# Patient Record
Sex: FEMALE | Race: WHITE | NOT HISPANIC OR LATINO | Employment: OTHER | URBAN - METROPOLITAN AREA
[De-identification: names, ages, dates, MRNs, and addresses within clinical notes are randomized per-mention and may not be internally consistent; named-entity substitution may affect disease eponyms.]

---

## 2018-06-02 ENCOUNTER — HOSPITAL ENCOUNTER (EMERGENCY)
Facility: MEDICAL CENTER | Age: 73
End: 2018-06-02
Attending: EMERGENCY MEDICINE
Payer: MEDICARE

## 2018-06-02 VITALS
SYSTOLIC BLOOD PRESSURE: 136 MMHG | DIASTOLIC BLOOD PRESSURE: 76 MMHG | HEIGHT: 66 IN | RESPIRATION RATE: 18 BRPM | WEIGHT: 136.91 LBS | OXYGEN SATURATION: 94 % | HEART RATE: 90 BPM | BODY MASS INDEX: 22 KG/M2 | TEMPERATURE: 98.8 F

## 2018-06-02 DIAGNOSIS — J18.9 ATYPICAL PNEUMONIA: ICD-10-CM

## 2018-06-02 PROCEDURE — 99283 EMERGENCY DEPT VISIT LOW MDM: CPT

## 2018-06-02 RX ORDER — AZITHROMYCIN 250 MG/1
TABLET, FILM COATED ORAL
Qty: 6 TAB | Refills: 0 | Status: SHIPPED | OUTPATIENT
Start: 2018-06-02 | End: 2023-09-06

## 2018-06-02 RX ORDER — ATORVASTATIN CALCIUM 20 MG/1
20 TABLET, FILM COATED ORAL NIGHTLY
COMMUNITY

## 2018-06-02 RX ORDER — LISINOPRIL 20 MG/1
20 TABLET ORAL EVERY EVENING
Status: SHIPPED | COMMUNITY
End: 2023-09-06

## 2018-06-02 RX ORDER — DEXAMETHASONE 4 MG/1
8 TABLET ORAL ONCE
Status: DISCONTINUED | OUTPATIENT
Start: 2018-06-02 | End: 2018-06-02 | Stop reason: HOSPADM

## 2018-06-02 NOTE — DISCHARGE INSTRUCTIONS
Drink lots of fluids  Take Motrin as needed for discomfort  Follow-up with her primary care provider if not better in 5-7 days  Utilize nasal saline spray as discussed

## 2018-06-02 NOTE — ED NOTES
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

## 2018-06-02 NOTE — ED NOTES
"Pt bib . C/o cough that for approximately 10 days. Sates she's coughing up small amount of flam. Denies fever/pain/ SOB.     /76   Pulse 90   Resp 18   Ht 1.676 m (5' 6\")   Wt 62.1 kg (136 lb 14.5 oz)   SpO2 94%   BMI 22.10 kg/m²     "

## 2018-06-02 NOTE — ED PROVIDER NOTES
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       HPI  Yu Kitchen is a 73 y.o. female who presents with a cough. The patient states she's been sick for approximately 10 days. She states initially started with some facial congestion and dry cough. However now she does have a productive component and some slight difficulty breathing. She is not having associated fevers. She has had some decrease in activity. She does not have any chest pain. The patient denies tobacco abuse. The patient is otherwise healthy.    REVIEW OF SYSTEMS  See HPI for further details. All other systems are negative.     PAST MEDICAL HISTORY  No past medical history on file.    SOCIAL HISTORY  Social History     Social History   • Marital status:      Spouse name: N/A   • Number of children: N/A   • Years of education: N/A     Social History Main Topics   • Smoking status: Not on file   • Smokeless tobacco: Not on file   • Alcohol use Not on file   • Drug use: Unknown   • Sexual activity: Not on file     Other Topics Concern   • Not on file     Social History Narrative   • No narrative on file           PHYSICAL EXAM  VITAL SIGNS: /76   Pulse 90   Temp 37.1 °C (98.8 °F)   Resp 18   Ht 1.676 m (5' 6\")   Wt 62.1 kg (136 lb 14.5 oz)   SpO2 94%   BMI 22.10 kg/m²   Constitutional: Well developed, Well nourished, No acute distress, Non-toxic appearance.   HENT: Normocephalic, Atraumatic, tympanic membranes are intact and nonerythematous bilaterally, Oropharynx moist without exudates or erythema, Nose swollen turbinates  Eyes: PERRLA, EOMI, Conjunctiva normal.  Neck: Supple without meningismus  Lymphatic: No lymphadenopathy noted.   Cardiovascular: Normal heart rate, Normal rhythm, No murmurs, No rubs, No gallops.   Thorax & Lungs: Normal breath sounds, No respiratory distress, No wheezing, No chest tenderness. "   Abdomen: Bowel sounds normal, Soft, No tenderness, no rebound, no guarding, no distention, No masses, No pulsatile masses.   Skin: Warm, Dry, No erythema, No rash.   Back: No tenderness, No CVA tenderness.   Extremities: Atraumatic with symmetric distal pulses, No edema, No tenderness, No cyanosis, No clubbing.   Neurologic: Alert & oriented x 3, cranial nerves II through XII are intact, Normal motor function, Normal sensory function, No focal deficits noted.   Psychiatric: Affect normal, Judgment normal, Mood normal.     COURSE & MEDICAL DECISION MAKING  Pertinent Labs & Imaging studies reviewed. (See chart for details)  Is a 73-year-old female who presents with MRSA part with productive cough. I suspect this started as a viral process. Due to the duration will treat her for possible atypical pneumonia as her cough has become more productive. The patient is aware that if she is not better on 5 days of azithromycin she is to follow-up with primary care doctor for imaging. We'll hold off on imaging at this time is a little change care. The patient receive a one-time dose of Decadron for inflammation prior to discharge. She does not appear toxic nor is she hypoxic.    FINAL IMPRESSION  1. Atypical pneumonia versus a viral bronchitis     Disposition  The patient will be discharged in stable condition    Electronically signed by: Pawan Marie, 6/2/2018 10:27 AM

## 2018-06-02 NOTE — ED NOTES
The Medication Reconciliation process has been completed by interviewing the patient    Allergies have been reviewed  Antibiotic use in 30 days - none    Home Pharmacy:  [facility]

## 2021-01-14 DIAGNOSIS — Z23 NEED FOR VACCINATION: ICD-10-CM

## 2022-12-30 PROBLEM — S62.629A: Status: ACTIVE | Noted: 2022-12-30

## 2022-12-30 PROBLEM — S82.001A CLOSED DISPLACED FRACTURE OF RIGHT PATELLA: Status: ACTIVE | Noted: 2022-12-30

## 2023-02-16 ENCOUNTER — APPOINTMENT (RX ONLY)
Dept: URBAN - METROPOLITAN AREA CLINIC 6 | Facility: CLINIC | Age: 78
Setting detail: DERMATOLOGY
End: 2023-02-16

## 2023-02-16 DIAGNOSIS — L82.0 INFLAMED SEBORRHEIC KERATOSIS: ICD-10-CM

## 2023-02-16 DIAGNOSIS — D18.0 HEMANGIOMA: ICD-10-CM

## 2023-02-16 DIAGNOSIS — Z71.89 OTHER SPECIFIED COUNSELING: ICD-10-CM

## 2023-02-16 DIAGNOSIS — D22 MELANOCYTIC NEVI: ICD-10-CM

## 2023-02-16 DIAGNOSIS — L73.8 OTHER SPECIFIED FOLLICULAR DISORDERS: ICD-10-CM

## 2023-02-16 DIAGNOSIS — L82.1 OTHER SEBORRHEIC KERATOSIS: ICD-10-CM

## 2023-02-16 DIAGNOSIS — L81.4 OTHER MELANIN HYPERPIGMENTATION: ICD-10-CM

## 2023-02-16 PROBLEM — D22.62 MELANOCYTIC NEVI OF LEFT UPPER LIMB, INCLUDING SHOULDER: Status: ACTIVE | Noted: 2023-02-16

## 2023-02-16 PROBLEM — D22.61 MELANOCYTIC NEVI OF RIGHT UPPER LIMB, INCLUDING SHOULDER: Status: ACTIVE | Noted: 2023-02-16

## 2023-02-16 PROBLEM — D18.01 HEMANGIOMA OF SKIN AND SUBCUTANEOUS TISSUE: Status: ACTIVE | Noted: 2023-02-16

## 2023-02-16 PROBLEM — D22.71 MELANOCYTIC NEVI OF RIGHT LOWER LIMB, INCLUDING HIP: Status: ACTIVE | Noted: 2023-02-16

## 2023-02-16 PROBLEM — D22.72 MELANOCYTIC NEVI OF LEFT LOWER LIMB, INCLUDING HIP: Status: ACTIVE | Noted: 2023-02-16

## 2023-02-16 PROBLEM — D22.5 MELANOCYTIC NEVI OF TRUNK: Status: ACTIVE | Noted: 2023-02-16

## 2023-02-16 PROCEDURE — ? COUNSELING

## 2023-02-16 PROCEDURE — 17110 DESTRUCTION B9 LES UP TO 14: CPT

## 2023-02-16 PROCEDURE — 99203 OFFICE O/P NEW LOW 30 MIN: CPT | Mod: 25

## 2023-02-16 PROCEDURE — ? LIQUID NITROGEN

## 2023-02-16 ASSESSMENT — LOCATION SIMPLE DESCRIPTION DERM
LOCATION SIMPLE: LEFT ANTERIOR NECK
LOCATION SIMPLE: RIGHT UPPER BACK
LOCATION SIMPLE: RIGHT THIGH
LOCATION SIMPLE: LEFT SHOULDER
LOCATION SIMPLE: GLABELLA
LOCATION SIMPLE: LEFT FOREARM
LOCATION SIMPLE: CHEST
LOCATION SIMPLE: RIGHT UPPER ARM
LOCATION SIMPLE: LEFT THIGH
LOCATION SIMPLE: RIGHT POSTERIOR THIGH
LOCATION SIMPLE: RIGHT LOWER BACK
LOCATION SIMPLE: LEFT UPPER ARM
LOCATION SIMPLE: LEFT CALF
LOCATION SIMPLE: LEFT CHEEK
LOCATION SIMPLE: RIGHT CALF
LOCATION SIMPLE: LEFT UPPER BACK
LOCATION SIMPLE: LEFT POSTERIOR THIGH
LOCATION SIMPLE: RIGHT FOREARM

## 2023-02-16 ASSESSMENT — LOCATION ZONE DERM
LOCATION ZONE: ARM
LOCATION ZONE: TRUNK
LOCATION ZONE: NECK
LOCATION ZONE: LEG
LOCATION ZONE: FACE

## 2023-02-16 ASSESSMENT — LOCATION DETAILED DESCRIPTION DERM
LOCATION DETAILED: LEFT PROXIMAL DORSAL FOREARM
LOCATION DETAILED: LEFT DISTAL POSTERIOR THIGH
LOCATION DETAILED: LEFT ANTERIOR PROXIMAL THIGH
LOCATION DETAILED: RIGHT ANTERIOR PROXIMAL THIGH
LOCATION DETAILED: RIGHT SUPERIOR LATERAL MIDBACK
LOCATION DETAILED: RIGHT PROXIMAL CALF
LOCATION DETAILED: GLABELLA
LOCATION DETAILED: LEFT VENTRAL DISTAL FOREARM
LOCATION DETAILED: LEFT ANTERIOR DISTAL UPPER ARM
LOCATION DETAILED: RIGHT VENTRAL DISTAL FOREARM
LOCATION DETAILED: LEFT INFERIOR UPPER BACK
LOCATION DETAILED: LEFT CLAVICULAR NECK
LOCATION DETAILED: LEFT ANTERIOR SHOULDER
LOCATION DETAILED: LEFT SUPERIOR UPPER BACK
LOCATION DETAILED: RIGHT MEDIAL INFERIOR CHEST
LOCATION DETAILED: LEFT CENTRAL MALAR CHEEK
LOCATION DETAILED: RIGHT MID-UPPER BACK
LOCATION DETAILED: RIGHT ANTERIOR DISTAL UPPER ARM
LOCATION DETAILED: LEFT PROXIMAL CALF
LOCATION DETAILED: RIGHT VENTRAL PROXIMAL FOREARM
LOCATION DETAILED: RIGHT DISTAL POSTERIOR THIGH
LOCATION DETAILED: RIGHT PROXIMAL DORSAL FOREARM

## 2023-02-16 NOTE — PROCEDURE: LIQUID NITROGEN
Include Z78.9 (Other Specified Conditions Influencing Health Status) As An Associated Diagnosis?: No
Post-Care Instructions: I reviewed with the patient in detail post-care instructions. Patient is to wear sunprotection, and avoid picking at any of the treated lesions. Pt may apply Vaseline to crusted or scabbing areas.
Medical Necessity Clause: This procedure was medically necessary because the lesions that were treated were:
Show Spray Paint Technique Variable?: Yes
Consent: The patient's consent was obtained including but not limited to risks of crusting, scabbing, blistering, scarring, darker or lighter pigmentary change, recurrence, incomplete removal and infection.
Detail Level: Detailed
Spray Paint Text: The liquid nitrogen was applied to the skin utilizing a spray paint frosting technique.
Medical Necessity Information: It is in your best interest to select a reason for this procedure from the list below. All of these items fulfill various CMS LCD requirements except the new and changing color options.

## 2023-02-16 NOTE — HPI: FULL BODY SKIN EXAMINATION
How Severe Are Your Spot(S)?: mild
What Type Of Note Output Would You Prefer (Optional)?: Standard Output
What Is The Reason For Today's Visit?: Full Body Skin Examination
What Is The Reason For Today's Visit? (Being Monitored For X): concerning skin lesions on an annual basis
details…

## 2023-09-06 ENCOUNTER — HOSPITAL ENCOUNTER (EMERGENCY)
Facility: MEDICAL CENTER | Age: 78
End: 2023-09-06
Attending: EMERGENCY MEDICINE
Payer: MEDICARE

## 2023-09-06 VITALS
SYSTOLIC BLOOD PRESSURE: 144 MMHG | HEART RATE: 79 BPM | WEIGHT: 137.35 LBS | BODY MASS INDEX: 22.07 KG/M2 | DIASTOLIC BLOOD PRESSURE: 80 MMHG | OXYGEN SATURATION: 96 % | RESPIRATION RATE: 18 BRPM | TEMPERATURE: 97.7 F | HEIGHT: 66 IN

## 2023-09-06 DIAGNOSIS — N39.0 ACUTE UTI: ICD-10-CM

## 2023-09-06 LAB
ALBUMIN SERPL BCP-MCNC: 4.2 G/DL (ref 3.2–4.9)
ALBUMIN/GLOB SERPL: 1.9 G/DL
ALP SERPL-CCNC: 71 U/L (ref 30–99)
ALT SERPL-CCNC: 15 U/L (ref 2–50)
ANION GAP SERPL CALC-SCNC: 12 MMOL/L (ref 7–16)
APPEARANCE UR: ABNORMAL
AST SERPL-CCNC: 19 U/L (ref 12–45)
BACTERIA #/AREA URNS HPF: ABNORMAL /HPF
BASOPHILS # BLD AUTO: 0.7 % (ref 0–1.8)
BASOPHILS # BLD: 0.06 K/UL (ref 0–0.12)
BILIRUB SERPL-MCNC: 0.6 MG/DL (ref 0.1–1.5)
BILIRUB UR QL STRIP.AUTO: NEGATIVE
BUN SERPL-MCNC: 24 MG/DL (ref 8–22)
CALCIUM ALBUM COR SERPL-MCNC: 9 MG/DL (ref 8.5–10.5)
CALCIUM SERPL-MCNC: 9.2 MG/DL (ref 8.4–10.2)
CHLORIDE SERPL-SCNC: 102 MMOL/L (ref 96–112)
CO2 SERPL-SCNC: 25 MMOL/L (ref 20–33)
COLOR UR: YELLOW
CREAT SERPL-MCNC: 0.78 MG/DL (ref 0.5–1.4)
EOSINOPHIL # BLD AUTO: 0.05 K/UL (ref 0–0.51)
EOSINOPHIL NFR BLD: 0.6 % (ref 0–6.9)
EPI CELLS #/AREA URNS HPF: ABNORMAL /HPF
ERYTHROCYTE [DISTWIDTH] IN BLOOD BY AUTOMATED COUNT: 43.8 FL (ref 35.9–50)
GFR SERPLBLD CREATININE-BSD FMLA CKD-EPI: 76 ML/MIN/1.73 M 2
GLOBULIN SER CALC-MCNC: 2.2 G/DL (ref 1.9–3.5)
GLUCOSE SERPL-MCNC: 109 MG/DL (ref 65–99)
GLUCOSE UR STRIP.AUTO-MCNC: NEGATIVE MG/DL
HCT VFR BLD AUTO: 42.3 % (ref 37–47)
HGB BLD-MCNC: 14.4 G/DL (ref 12–16)
IMM GRANULOCYTES # BLD AUTO: 0.02 K/UL (ref 0–0.11)
IMM GRANULOCYTES NFR BLD AUTO: 0.2 % (ref 0–0.9)
KETONES UR STRIP.AUTO-MCNC: NEGATIVE MG/DL
LEUKOCYTE ESTERASE UR QL STRIP.AUTO: ABNORMAL
LYMPHOCYTES # BLD AUTO: 1.48 K/UL (ref 1–4.8)
LYMPHOCYTES NFR BLD: 17.2 % (ref 22–41)
MCH RBC QN AUTO: 30.9 PG (ref 27–33)
MCHC RBC AUTO-ENTMCNC: 34 G/DL (ref 32.2–35.5)
MCV RBC AUTO: 90.8 FL (ref 81.4–97.8)
MICRO URNS: ABNORMAL
MONOCYTES # BLD AUTO: 0.71 K/UL (ref 0–0.85)
MONOCYTES NFR BLD AUTO: 8.2 % (ref 0–13.4)
MUCOUS THREADS #/AREA URNS HPF: ABNORMAL /HPF
NEUTROPHILS # BLD AUTO: 6.29 K/UL (ref 1.82–7.42)
NEUTROPHILS NFR BLD: 73.1 % (ref 44–72)
NITRITE UR QL STRIP.AUTO: NEGATIVE
NRBC # BLD AUTO: 0 K/UL
NRBC BLD-RTO: 0 /100 WBC (ref 0–0.2)
PH UR STRIP.AUTO: 7 [PH] (ref 5–8)
PLATELET # BLD AUTO: 266 K/UL (ref 164–446)
PMV BLD AUTO: 9.1 FL (ref 9–12.9)
POTASSIUM SERPL-SCNC: 3.9 MMOL/L (ref 3.6–5.5)
PROT SERPL-MCNC: 6.4 G/DL (ref 6–8.2)
PROT UR QL STRIP: NEGATIVE MG/DL
RBC # BLD AUTO: 4.66 M/UL (ref 4.2–5.4)
RBC # URNS HPF: ABNORMAL /HPF
RBC UR QL AUTO: NEGATIVE
SODIUM SERPL-SCNC: 139 MMOL/L (ref 135–145)
SP GR UR STRIP.AUTO: 1.01
UNIDENT CRYS URNS QL MICRO: ABNORMAL /HPF
WBC # BLD AUTO: 8.6 K/UL (ref 4.8–10.8)
WBC #/AREA URNS HPF: ABNORMAL /HPF

## 2023-09-06 PROCEDURE — 85025 COMPLETE CBC W/AUTO DIFF WBC: CPT

## 2023-09-06 PROCEDURE — 87077 CULTURE AEROBIC IDENTIFY: CPT

## 2023-09-06 PROCEDURE — 81001 URINALYSIS AUTO W/SCOPE: CPT

## 2023-09-06 PROCEDURE — 80053 COMPREHEN METABOLIC PANEL: CPT

## 2023-09-06 PROCEDURE — 87086 URINE CULTURE/COLONY COUNT: CPT

## 2023-09-06 PROCEDURE — 700102 HCHG RX REV CODE 250 W/ 637 OVERRIDE(OP): Performed by: EMERGENCY MEDICINE

## 2023-09-06 PROCEDURE — A9270 NON-COVERED ITEM OR SERVICE: HCPCS | Performed by: EMERGENCY MEDICINE

## 2023-09-06 PROCEDURE — 36415 COLL VENOUS BLD VENIPUNCTURE: CPT

## 2023-09-06 PROCEDURE — 99284 EMERGENCY DEPT VISIT MOD MDM: CPT

## 2023-09-06 RX ORDER — CEFDINIR 300 MG/1
300 CAPSULE ORAL ONCE
Status: COMPLETED | OUTPATIENT
Start: 2023-09-06 | End: 2023-09-06

## 2023-09-06 RX ORDER — IBUPROFEN 200 MG
200 TABLET ORAL EVERY 6 HOURS PRN
COMMUNITY

## 2023-09-06 RX ORDER — CEFDINIR 300 MG/1
300 CAPSULE ORAL 2 TIMES DAILY
Qty: 14 CAPSULE | Refills: 0 | Status: ACTIVE | OUTPATIENT
Start: 2023-09-06 | End: 2023-09-13

## 2023-09-06 RX ADMIN — CEFDINIR 300 MG: 300 CAPSULE ORAL at 17:23

## 2023-09-06 NOTE — LETTER
9/13/2023               Yu Kitchen  [address] [address]        Dear Yu (MR#[id number])    This letter is sent in regards to your recent visit to the [facility] Emergency Department on 9/6/2023. During the visit, tests were performed to assist the physician in your medical diagnosis. A review of your tests requires that we notify you of the following:    Your urine culture was POSITIVE for a bacteria called Aerococcus urinae. The antibiotic prescribed for you (cefdinir) should be active to treat this bacteria. It is important that you continue taking your antibiotic until it is finished.     Please feel free to contact me at the number below if you have any questions or concerns. Thank you for your cooperation in the matter.    Sincerely,  ED Culture Follow-Up Staff  Houston Landeros, PharmD    Formerly [facility], Emergency Department  [address] [place] [id number] [phone] (ED Culture Line)

## 2023-09-06 NOTE — ED PROVIDER NOTES
"  ER Provider Note    Scribed for Trevor Colindres M.D. by Jacklyn Barron. 9/6/2023  4:24 PM    Primary Care Provider: Pcp Not In Computer    CHIEF COMPLAINT  Chief Complaint   Patient presents with    Painful Urination     Reports urinary frequency and dysuria x approx 3-4d. Denies flank pain or fevers.     EXTERNAL RECORDS REVIEWED  Other none    HPI/ROS  LIMITATION TO HISTORY   Select: : None  OUTSIDE HISTORIAN(S):  None    Yu Kitchen is a 78 y.o. female who presents to the ED complaining of painful urination onset 3-4 days ago. Patient reports she also has associated symptoms of urinary incontinence, but denies flank pain, fevers, or blood in her urine. She states she has been drinking a lot of fluids. She denies a history of kidney stones.     PAST MEDICAL HISTORY  Past Medical History:   Diagnosis Date    Arthritis     Hypercholesteremia     Hypertension        SURGICAL HISTORY  History reviewed. No pertinent surgical history.    FAMILY HISTORY  History reviewed. No pertinent family history.    SOCIAL HISTORY   reports that she has never smoked. She has never used smokeless tobacco. She reports current alcohol use. She reports that she does not currently use drugs.    CURRENT MEDICATIONS  Previous Medications    ATORVASTATIN (LIPITOR) 20 MG TAB    Take 20 mg by mouth every evening.    CHOLECALCIFEROL (D3 PO)    Take 1 Capsule by mouth every day.    CYANOCOBALAMIN (B-12 PO)    Take 1 Tablet by mouth every day.    DIPHENHYDRAMINE HCL (ALLERGY MEDICATION PO)    Take 0.5 Tablets by mouth every day. (OTC)    IBUPROFEN (MOTRIN) 200 MG TAB    Take 200 mg by mouth every 6 hours as needed. Indications: Pain    LISINOPRIL (PRINIVIL) 5 MG TAB    Take 5 mg by mouth every evening.    MULTIPLE VITAMINS-MINERALS (PRESERVISION AREDS 2 PO)    Take 1 Capsule by mouth every day.       ALLERGIES  Procaine    PHYSICAL EXAM  BP (!) 150/79   Pulse 92   Temp 36.7 °C (98.1 °F) (Temporal)   Resp 20   Ht 1.676 m (5' 6\")   Wt 62.3 kg " (137 lb 5.6 oz)   SpO2 93%   BMI 22.17 kg/m²   Constitutional: Well developed, Well nourished, No acute distress, Non-toxic appearance.   HENT: Normocephalic, Atraumatic, mucous membranes moist, no erythema, exudates, swelling, or masses, nares patent  Eyes: nonicteric  Neck: Supple, no meningismus  Lymphatic: No lymphadenopathy noted.   Cardiovascular: Regular rate and rhythm, no gallops rubs or murmurs  Lungs: Clear bilaterally   Abdomen: Soft and nontender throughout  Skin: Warm, Dry, no rash  Genitalia: Deferred  Rectal: Deferred  Extremities: No edema  Neurologic: Alert, appropriate, follows commands, moving all extremities, normal speech   Psychiatric: Affect normal     DIAGNOSTIC STUDIES    Labs:   Results for orders placed or performed during the hospital encounter of 09/06/23   Urinalysis, Culture if Indicated    Specimen: Urine, Clean Catch   Result Value Ref Range    Color Yellow     Character Cloudy (A)     Specific Gravity 1.015 <1.035    Ph 7.0 5.0 - 8.0    Glucose Negative Negative mg/dL    Ketones Negative Negative mg/dL    Protein Negative Negative mg/dL    Bilirubin Negative Negative    Nitrite Negative Negative    Leukocyte Esterase Moderate (A) Negative    Occult Blood Negative Negative    Micro Urine Req Microscopic    CBC WITH DIFFERENTIAL   Result Value Ref Range    WBC 8.6 4.8 - 10.8 K/uL    RBC 4.66 4.20 - 5.40 M/uL    Hemoglobin 14.4 12.0 - 16.0 g/dL    Hematocrit 42.3 37.0 - 47.0 %    MCV 90.8 81.4 - 97.8 fL    MCH 30.9 27.0 - 33.0 pg    MCHC 34.0 32.2 - 35.5 g/dL    RDW 43.8 35.9 - 50.0 fL    Platelet Count 266 164 - 446 K/uL    MPV 9.1 9.0 - 12.9 fL    Neutrophils-Polys 73.10 (H) 44.00 - 72.00 %    Lymphocytes 17.20 (L) 22.00 - 41.00 %    Monocytes 8.20 0.00 - 13.40 %    Eosinophils 0.60 0.00 - 6.90 %    Basophils 0.70 0.00 - 1.80 %    Immature Granulocytes 0.20 0.00 - 0.90 %    Nucleated RBC 0.00 0.00 - 0.20 /100 WBC    Neutrophils (Absolute) 6.29 1.82 - 7.42 K/uL    Lymphs (Absolute)  1.48 1.00 - 4.80 K/uL    Monos (Absolute) 0.71 0.00 - 0.85 K/uL    Eos (Absolute) 0.05 0.00 - 0.51 K/uL    Baso (Absolute) 0.06 0.00 - 0.12 K/uL    Immature Granulocytes (abs) 0.02 0.00 - 0.11 K/uL    NRBC (Absolute) 0.00 K/uL   COMP METABOLIC PANEL   Result Value Ref Range    Sodium 139 135 - 145 mmol/L    Potassium 3.9 3.6 - 5.5 mmol/L    Chloride 102 96 - 112 mmol/L    Co2 25 20 - 33 mmol/L    Anion Gap 12.0 7.0 - 16.0    Glucose 109 (H) 65 - 99 mg/dL    Bun 24 (H) 8 - 22 mg/dL    Creatinine 0.78 0.50 - 1.40 mg/dL    Calcium 9.2 8.4 - 10.2 mg/dL    Correct Calcium 9.0 8.5 - 10.5 mg/dL    AST(SGOT) 19 12 - 45 U/L    ALT(SGPT) 15 2 - 50 U/L    Alkaline Phosphatase 71 30 - 99 U/L    Total Bilirubin 0.6 0.1 - 1.5 mg/dL    Albumin 4.2 3.2 - 4.9 g/dL    Total Protein 6.4 6.0 - 8.2 g/dL    Globulin 2.2 1.9 - 3.5 g/dL    A-G Ratio 1.9 g/dL   URINE MICROSCOPIC (W/UA)   Result Value Ref Range    WBC 20-50 (A) /hpf    RBC 0-2 /hpf    Bacteria Moderate (A) None /hpf    Epithelial Cells Rare Few /hpf    Mucous Threads Few /hpf    Urine Crystals Few Amorphous /hpf   URINE CULTURE(NEW)    Specimen: Urine   Result Value Ref Range    Significant Indicator NEG     Source UR     Site -     Culture Result -    ESTIMATED GFR   Result Value Ref Range    GFR (CKD-EPI) 76 >60 mL/min/1.73 m 2        COURSE & MEDICAL DECISION MAKING     ED Observation Status? Yes; I am placing the patient in to an observation status due to a diagnostic uncertainty as well as therapeutic intensity. Patient placed in observation status at 4:28 PM, 9/6/2023.     Observation plan is as follows: Labs    Upon Reevaluation, the patient's condition has: Improved; and will be discharged.    Patient discharged from ED Observation status at 5:26 PM (Time) (9/6/2023) (Date).     INITIAL ASSESSMENT, COURSE AND PLAN  Care Narrative: This is an 78-year-old who presents with dysuria-she has evidence of a UTI.  I doubt kidney stone.  Patient's labs including CBC and  metabolic panel are reassuring.  She was given a dose of Omnicef here and will be discharged on 1 week of the same.  She will follow-up with her regular doctor.    4:24 PM - Patient seen and examined at bedside. Discussed plan of care, including labs. Patient agrees to the plan of care. Ordered for UA Culture, UC, UA, CMP, and CBC w/ Diff to evaluate her symptoms.      4:53 PM - Patient was medicated with cefdinir 300 mg. Patient verbalizes understanding and agreement to this plan of care.      5:27 PM - I reevaluated the patient at bedside. The patient informs me they feel improved following administration. I discussed the patient's diagnostic study results which show she has a UTI. I discussed plan for discharge and follow up as outlined below. The patient is stable for discharge at this time and will return for any new or worsening symptoms. Patient verbalizes understanding and support with my plan for discharge.      ADDITIONAL PROBLEM LIST      DISPOSITION AND DISCUSSIONS  I have discussed management of the patient with the following physicians and SAIRA's:  None    Barriers to care at this time, including but not limited to:  None .     The patient will return for new or worsening symptoms and is stable at the time of discharge.    The patient is referred to a primary physician for blood pressure management, diabetic screening, and for all other preventative health concerns.    DISPOSITION:  Patient will be discharged home in stable condition.    FOLLOW UP:  [place] [address] [facility] [address]  [phone]  Schedule an appointment as soon as possible for a visit today        OUTPATIENT MEDICATIONS:  New Prescriptions    CEFDINIR (OMNICEF) 300 MG CAP    Take 1 Capsule by mouth 2 times a day for 7 days.        FINAL DIANGOSIS  1. Acute UTI       Jacklyn GUILLEN (Nicole), am scribing for, and in the presence of, Trevor Colindres M.D..    Electronically signed by: Jacklyn Zaragoza), 9/6/2023    [place]IL[place]  Trevor Colindres M.D. personally performed the services described in this documentation, as scribed by Jacklyn Barron in my presence, and it is both accurate and complete.   The note accurately reflects work and decisions made by me.  Trevor Colindres M.D.  9/6/2023  5:39 PM

## 2023-09-07 NOTE — ED NOTES
Med rec updated and complete, per pt   Allergies reviewed, per pt  Pt reports that she is not sure the strength of her LISINOPRIL.  Called [facility] @  [phone] to verify strength of LISINOPRIL (5MG)

## 2023-09-09 LAB
BACTERIA UR CULT: ABNORMAL
BACTERIA UR CULT: ABNORMAL
SIGNIFICANT IND 70042: ABNORMAL
SITE SITE: ABNORMAL
SOURCE SOURCE: ABNORMAL

## 2023-09-13 NOTE — ED NOTES
"ED Positive Culture Follow-up/Notification Note:    Date: 9/13/23     Patient seen in the ED on 9/6/2023 for evaluation of 3 to 4 days of painful urination. Patient reports some urinary incontinence but denies flank pain, fevers, and hematuria.  1. Acute UTI       Discharge Medication List as of 9/6/2023  5:39 PM        START taking these medications    Details   cefdinir (OMNICEF) 300 MG Cap Take 1 Capsule by mouth 2 times a day for 7 days., Disp-14 Capsule, R-0, Normal             Allergies: Procaine     Vitals:    09/06/23 1601 09/06/23 1745   BP: (!) 150/79 (!) 144/80   Pulse: 92 79   Resp: 20 18   Temp: 36.7 °C (98.1 °F) 36.5 °C (97.7 °F)   TempSrc: Temporal Temporal   SpO2: 93% 96%   Weight: 62.3 kg (137 lb 5.6 oz)    Height: 1.676 m (5' 6\")        Final cultures:   Results       Procedure Component Value Units Date/Time    URINE CULTURE(NEW) [[id number]]  (Abnormal) Collected: 09/06/23 1619    Order Status: Completed Specimen: Urine Updated: 09/09/23 0909     Significant Indicator POS     Source UR     Site -     Culture Result -      Aerococcus urinae  >100,000 cfu/mL      Narrative:      Indication for culture:->Patient WITHOUT an indwelling Bennett  catheter in place with new onset of Dysuria, Frequency,  Urgency, and/or Suprapubic pain    Urinalysis, Culture if Indicated [[id number]]  (Abnormal) Collected: 09/06/23 1619    Order Status: Completed Specimen: Urine, Clean Catch Updated: 09/06/23 1640     Color Yellow     Character Cloudy     Specific Gravity 1.015     Ph 7.0     Glucose Negative mg/dL      Ketones Negative mg/dL      Protein Negative mg/dL      Bilirubin Negative     Nitrite Negative     Leukocyte Esterase Moderate     Occult Blood Negative     Micro Urine Req Microscopic    Narrative:      Indication for culture:->Patient WITHOUT an indwelling Bennett  catheter in place with new onset of Dysuria, Frequency,  Urgency, and/or Suprapubic pain            Plan:   Urine culture positive for Aerococcus " urinae. This bacteria is likely susceptible to cefdinir. Attempted to call patient and left voice message.   Sent letter to patient to notify of positive culture result and encourage compliance with prescribed antibiotics.   Update 9/13/23 @ 9286  Patient called back to discuss results. Patient reports feeling much better and is no longer reporting symptoms of urinary tract infection. Patient finished antibiotic course. There are no further concerns at this time.  Houston Landeros, PharmD

## 2023-09-20 PROBLEM — M18.11 PRIMARY OSTEOARTHRITIS OF FIRST CARPOMETACARPAL JOINT OF RIGHT HAND: Status: ACTIVE | Noted: 2023-09-20

## 2023-11-01 ENCOUNTER — APPOINTMENT (OUTPATIENT)
Dept: INTERNAL MEDICINE | Facility: IMAGING CENTER | Age: 78
End: 2023-11-01
Payer: MEDICARE

## 2023-11-29 ENCOUNTER — PATIENT MESSAGE (OUTPATIENT)
Dept: HEALTH INFORMATION MANAGEMENT | Facility: OTHER | Age: 78
End: 2023-11-29

## 2023-12-08 ENCOUNTER — APPOINTMENT (OUTPATIENT)
Dept: INTERNAL MEDICINE | Facility: IMAGING CENTER | Age: 78
End: 2023-12-08
Payer: MEDICARE

## 2024-01-09 ENCOUNTER — OFFICE VISIT (OUTPATIENT)
Dept: INTERNAL MEDICINE | Facility: IMAGING CENTER | Age: 79
End: 2024-01-09
Payer: MEDICARE

## 2024-01-09 VITALS
HEIGHT: 66 IN | BODY MASS INDEX: 22.18 KG/M2 | OXYGEN SATURATION: 95 % | RESPIRATION RATE: 14 BRPM | TEMPERATURE: 97.9 F | DIASTOLIC BLOOD PRESSURE: 78 MMHG | WEIGHT: 138 LBS | HEART RATE: 79 BPM | SYSTOLIC BLOOD PRESSURE: 158 MMHG

## 2024-01-09 DIAGNOSIS — Z78.0 ASYMPTOMATIC POSTMENOPAUSAL STATUS: ICD-10-CM

## 2024-01-09 DIAGNOSIS — D12.6 TUBULAR ADENOMA OF COLON: ICD-10-CM

## 2024-01-09 DIAGNOSIS — Z79.899 MEDICATION MANAGEMENT: ICD-10-CM

## 2024-01-09 DIAGNOSIS — I10 ESSENTIAL HYPERTENSION, BENIGN: Chronic | ICD-10-CM

## 2024-01-09 DIAGNOSIS — Z80.3 FAMILY HISTORY OF BREAST CANCER IN MOTHER: ICD-10-CM

## 2024-01-09 DIAGNOSIS — K21.00 GASTROESOPHAGEAL REFLUX DISEASE WITH ESOPHAGITIS WITHOUT HEMORRHAGE: ICD-10-CM

## 2024-01-09 DIAGNOSIS — Z76.89 ESTABLISHING CARE WITH NEW DOCTOR, ENCOUNTER FOR: ICD-10-CM

## 2024-01-09 DIAGNOSIS — Z12.31 ENCOUNTER FOR SCREENING MAMMOGRAM FOR BREAST CANCER: ICD-10-CM

## 2024-01-09 DIAGNOSIS — E78.5 HYPERLIPIDEMIA, UNSPECIFIED HYPERLIPIDEMIA TYPE: Chronic | ICD-10-CM

## 2024-01-09 PROCEDURE — 3077F SYST BP >= 140 MM HG: CPT | Performed by: FAMILY MEDICINE

## 2024-01-09 PROCEDURE — 99204 OFFICE O/P NEW MOD 45 MIN: CPT | Performed by: FAMILY MEDICINE

## 2024-01-09 PROCEDURE — 3078F DIAST BP <80 MM HG: CPT | Performed by: FAMILY MEDICINE

## 2024-01-09 ASSESSMENT — FIBROSIS 4 INDEX: FIB4 SCORE: 1.48

## 2024-01-09 ASSESSMENT — PATIENT HEALTH QUESTIONNAIRE - PHQ9: CLINICAL INTERPRETATION OF PHQ2 SCORE: 0

## 2024-01-11 ENCOUNTER — NON-PROVIDER VISIT (OUTPATIENT)
Dept: INTERNAL MEDICINE | Facility: IMAGING CENTER | Age: 79
End: 2024-01-11
Payer: MEDICARE

## 2024-01-11 ENCOUNTER — HOSPITAL ENCOUNTER (OUTPATIENT)
Facility: MEDICAL CENTER | Age: 79
End: 2024-01-11
Attending: FAMILY MEDICINE
Payer: MEDICARE

## 2024-01-11 DIAGNOSIS — Z79.899 MEDICATION MANAGEMENT: ICD-10-CM

## 2024-01-11 DIAGNOSIS — Z01.89 ENCOUNTER FOR ROUTINE LABORATORY TESTING: ICD-10-CM

## 2024-01-11 PROBLEM — I34.1 MVP (MITRAL VALVE PROLAPSE): Status: ACTIVE | Noted: 2018-08-06

## 2024-01-11 PROBLEM — M80.00XA PATHOLOGICAL FRACTURE DUE TO AGE-RELATED OSTEOPOROSIS: Status: ACTIVE | Noted: 2023-08-07

## 2024-01-11 PROBLEM — H35.363 MACULAR DRUSEN, BILATERAL: Status: ACTIVE | Noted: 2021-12-20

## 2024-01-11 PROBLEM — M80.00XA PATHOLOGICAL FRACTURE DUE TO AGE-RELATED OSTEOPOROSIS: Status: RESOLVED | Noted: 2023-08-07 | Resolved: 2024-01-11

## 2024-01-11 PROBLEM — H18.513 FUCHS' CORNEAL DYSTROPHY OF BOTH EYES: Status: ACTIVE | Noted: 2021-12-20

## 2024-01-11 PROBLEM — M85.80 OTHER SPECIFIED DISORDERS OF BONE DENSITY AND STRUCTURE, UNSPECIFIED SITE: Status: ACTIVE | Noted: 2019-05-01

## 2024-01-11 LAB
25(OH)D3 SERPL-MCNC: 56 NG/ML (ref 30–100)
ALBUMIN SERPL BCP-MCNC: 4.5 G/DL (ref 3.2–4.9)
ALBUMIN/GLOB SERPL: 2.1 G/DL
ALP SERPL-CCNC: 78 U/L (ref 30–99)
ALT SERPL-CCNC: 12 U/L (ref 2–50)
ANION GAP SERPL CALC-SCNC: 11 MMOL/L (ref 7–16)
APPEARANCE UR: CLEAR
AST SERPL-CCNC: 20 U/L (ref 12–45)
BACTERIA #/AREA URNS HPF: ABNORMAL /HPF
BASOPHILS # BLD AUTO: 1.2 % (ref 0–1.8)
BASOPHILS # BLD: 0.07 K/UL (ref 0–0.12)
BILIRUB SERPL-MCNC: 0.5 MG/DL (ref 0.1–1.5)
BILIRUB UR QL STRIP.AUTO: NEGATIVE
BUN SERPL-MCNC: 19 MG/DL (ref 8–22)
CALCIUM ALBUM COR SERPL-MCNC: 9 MG/DL (ref 8.5–10.5)
CALCIUM SERPL-MCNC: 9.4 MG/DL (ref 8.5–10.5)
CHLORIDE SERPL-SCNC: 103 MMOL/L (ref 96–112)
CHOLEST SERPL-MCNC: 198 MG/DL (ref 100–199)
CO2 SERPL-SCNC: 25 MMOL/L (ref 20–33)
COLOR UR: YELLOW
CREAT SERPL-MCNC: 0.95 MG/DL (ref 0.5–1.4)
EOSINOPHIL # BLD AUTO: 0.1 K/UL (ref 0–0.51)
EOSINOPHIL NFR BLD: 1.8 % (ref 0–6.9)
EPI CELLS #/AREA URNS HPF: ABNORMAL /HPF
ERYTHROCYTE [DISTWIDTH] IN BLOOD BY AUTOMATED COUNT: 47.1 FL (ref 35.9–50)
EST. AVERAGE GLUCOSE BLD GHB EST-MCNC: 108 MG/DL
GFR SERPLBLD CREATININE-BSD FMLA CKD-EPI: 60 ML/MIN/1.73 M 2
GLOBULIN SER CALC-MCNC: 2.1 G/DL (ref 1.9–3.5)
GLUCOSE SERPL-MCNC: 85 MG/DL (ref 65–99)
GLUCOSE UR STRIP.AUTO-MCNC: NEGATIVE MG/DL
HBA1C MFR BLD: 5.4 % (ref 4–5.6)
HCT VFR BLD AUTO: 45.3 % (ref 37–47)
HDLC SERPL-MCNC: 79 MG/DL
HGB BLD-MCNC: 15.2 G/DL (ref 12–16)
HYALINE CASTS #/AREA URNS LPF: ABNORMAL /LPF
IMM GRANULOCYTES # BLD AUTO: 0.01 K/UL (ref 0–0.11)
IMM GRANULOCYTES NFR BLD AUTO: 0.2 % (ref 0–0.9)
KETONES UR STRIP.AUTO-MCNC: NEGATIVE MG/DL
LDLC SERPL CALC-MCNC: 104 MG/DL
LEUKOCYTE ESTERASE UR QL STRIP.AUTO: ABNORMAL
LYMPHOCYTES # BLD AUTO: 1.49 K/UL (ref 1–4.8)
LYMPHOCYTES NFR BLD: 26.5 % (ref 22–41)
MCH RBC QN AUTO: 30.8 PG (ref 27–33)
MCHC RBC AUTO-ENTMCNC: 33.6 G/DL (ref 32.2–35.5)
MCV RBC AUTO: 91.7 FL (ref 81.4–97.8)
MICRO URNS: ABNORMAL
MONOCYTES # BLD AUTO: 0.69 K/UL (ref 0–0.85)
MONOCYTES NFR BLD AUTO: 12.3 % (ref 0–13.4)
NEUTROPHILS # BLD AUTO: 3.26 K/UL (ref 1.82–7.42)
NEUTROPHILS NFR BLD: 58 % (ref 44–72)
NITRITE UR QL STRIP.AUTO: NEGATIVE
NRBC # BLD AUTO: 0 K/UL
NRBC BLD-RTO: 0 /100 WBC (ref 0–0.2)
PH UR STRIP.AUTO: 6 [PH] (ref 5–8)
PLATELET # BLD AUTO: 312 K/UL (ref 164–446)
PMV BLD AUTO: 9.9 FL (ref 9–12.9)
POTASSIUM SERPL-SCNC: 4.2 MMOL/L (ref 3.6–5.5)
PROT SERPL-MCNC: 6.6 G/DL (ref 6–8.2)
PROT UR QL STRIP: NEGATIVE MG/DL
RBC # BLD AUTO: 4.94 M/UL (ref 4.2–5.4)
RBC # URNS HPF: ABNORMAL /HPF
RBC UR QL AUTO: NEGATIVE
SODIUM SERPL-SCNC: 139 MMOL/L (ref 135–145)
SP GR UR STRIP.AUTO: 1.02
TRIGL SERPL-MCNC: 75 MG/DL (ref 0–149)
TSH SERPL DL<=0.005 MIU/L-ACNC: 2.68 UIU/ML (ref 0.38–5.33)
UROBILINOGEN UR STRIP.AUTO-MCNC: 0.2 MG/DL
VIT B12 SERPL-MCNC: 1821 PG/ML (ref 211–911)
WBC # BLD AUTO: 5.6 K/UL (ref 4.8–10.8)
WBC #/AREA URNS HPF: ABNORMAL /HPF

## 2024-01-11 PROCEDURE — 80053 COMPREHEN METABOLIC PANEL: CPT

## 2024-01-11 PROCEDURE — 87086 URINE CULTURE/COLONY COUNT: CPT

## 2024-01-11 PROCEDURE — 81001 URINALYSIS AUTO W/SCOPE: CPT

## 2024-01-11 PROCEDURE — 85025 COMPLETE CBC W/AUTO DIFF WBC: CPT

## 2024-01-11 PROCEDURE — 82607 VITAMIN B-12: CPT

## 2024-01-11 PROCEDURE — 83036 HEMOGLOBIN GLYCOSYLATED A1C: CPT

## 2024-01-11 PROCEDURE — 80061 LIPID PANEL: CPT

## 2024-01-11 PROCEDURE — 82306 VITAMIN D 25 HYDROXY: CPT

## 2024-01-11 PROCEDURE — 84443 ASSAY THYROID STIM HORMONE: CPT

## 2024-01-11 NOTE — PROGRESS NOTES
"  Subjective:     HPI:   Yu Kitchen is a 78 y.o. female here  to discuss the evaluation and management of:   Chief Complaint   Patient presents with    New Patient     She is living in [place] now and transferring her care from [place]  She is very active including 35 minutes of walking or treadmill per day, she also swims      She has been seen by urology in the past as well as gastroenterology    She is concerned because there was a cysts in her liver as well as kidneys and wondered if she needs to keep having imaging follow-up    She does have a history of tubular adenoma and needs repeat colonoscopy in 9/2024       Objective:     BP (!) 158/78   Pulse 79   Temp 36.6 °C (97.9 °F)   Resp 14   Ht 1.664 m (5' 5.5\")   Wt 62.6 kg (138 lb)   SpO2 95%   BMI 22.62 kg/m²     Physical Exam:  Physical Exam  Constitutional:       General: She is not in acute distress.     Appearance: Normal appearance. She is normal weight.   HENT:      Head: Normocephalic and atraumatic.   Eyes:      Conjunctiva/sclera: Conjunctivae normal.   Cardiovascular:      Rate and Rhythm: Normal rate and regular rhythm.      Heart sounds: Normal heart sounds.   Pulmonary:      Effort: Pulmonary effort is normal. No respiratory distress.      Breath sounds: Normal breath sounds. No stridor. No wheezing, rhonchi or rales.   Musculoskeletal:      Right lower leg: No edema.      Left lower leg: No edema.   Skin:     General: Skin is warm and dry.   Neurological:      General: No focal deficit present.      Mental Status: She is alert.   Psychiatric:         Mood and Affect: Mood normal.         Behavior: Behavior normal.            Assessment and Plan:     The following treatment plan was discussed/researched:  1. Medication management     - CBC WITH DIFFERENTIAL; Future  - TSH; Future  - Lipid Profile; Future  - Comp Metabolic Panel; Future  - VITAMIN D 25-HYDROXY; Future  - VITAMIN B12; Future  - HEMOGLOBIN A1C; Future  - " URINALYSIS,CULTURE IF INDICATED; Future    2. Essential hypertension, benign  Repeat blood pressure was normal at 135/72    3. Hyperlipidemia, unspecified hyperlipidemia type  Stable on statin           4. Tubular adenoma of colon  - Referral to GI for Colonoscopy    5. Family history of breast cancer in mother-patient wants to continue with her mammograms  - MA-SCREENING MAMMO BILAT W/TOMOSYNTHESIS W/CAD; Future    6. Encounter for screening mammogram for breast cancer  - MA-SCREENING MAMMO BILAT W/TOMOSYNTHESIS W/CAD; Future    7. Asymptomatic postmenopausal status-history of osteopenia  - DS-BONE DENSITY STUDY (DEXA); Future    8. Gerd-she will try Pepcid twice a day and avoidance of triggers like alcohol  Recheck at annual exam        9.Establishing care with new doctor, encounter for                  Medical Decision Making: Today's Assessment/Status/Plan:                                                                                                                                                                                    Any change or worsening of signs or symptoms, patient encouraged to follow-up or report to emergency room for further evaluation. Patient verbalizes understanding and agrees.    Follow-Up: No follow-ups on file.      PLEASE NOTE: This dictation was created using voice recognition software. I have made every reasonable attempt to correct obvious errors, but I expect that there are errors of grammar and possibly content that I did not discover before finalizing the note.      My total time spent caring for the patient on the day of the encounter was  greater than 40 minutes.   This includes obtaining history, reviewing chart, physical exam, patient education, reviewing outside records, placing orders, interpreting tests and coordinating care.

## 2024-01-11 NOTE — PATIENT INSTRUCTIONS
In case you get another infection here is some information:  Urinary Tract Infection, Adult  A urinary tract infection (UTI) is an infection of any part of the urinary tract. The urinary tract includes:  The kidneys.  The ureters.  The bladder.  The urethra.  These organs make, store, and get rid of pee (urine) in the body.  What are the causes?  This infection is caused by germs (bacteria) in your genital area. These germs grow and cause swelling (inflammation) of your urinary tract.  What increases the risk?  The following factors may make you more likely to develop this condition:  Using a small, thin tube (catheter) to drain pee.  Not being able to control when you pee or poop (incontinence).  Being female. If you are female, these things can increase the risk:  Using these methods to prevent pregnancy:  A medicine that kills sperm (spermicide).  A device that blocks sperm (diaphragm).  Having low levels of a female hormone (estrogen).  Being pregnant.  You are more likely to develop this condition if:  You have genes that add to your risk.  You are sexually active.  You take antibiotic medicines.  You have trouble peeing because of:  A prostate that is bigger than normal, if you are male.  A blockage in the part of your body that drains pee from the bladder.  A kidney stone.  A nerve condition that affects your bladder.  Not getting enough to drink.  Not peeing often enough.  You have other conditions, such as:  Diabetes.  A weak disease-fighting system (immune system).  Sickle cell disease.  Gout.  Injury of the spine.  What are the signs or symptoms?  Symptoms of this condition include:  Needing to pee right away.  Peeing small amounts often.  Pain or burning when peeing.  Blood in the pee.  Pee that smells bad or not like normal.  Trouble peeing.  Pee that is cloudy.  Fluid coming from the vagina, if you are female.  Pain in the belly or lower back.  Other symptoms include:  Vomiting.  Not feeling  hungry.  Feeling mixed up (confused). This may be the first symptom in older adults.  Being tired and grouchy (irritable).  A fever.  Watery poop (diarrhea).  How is this treated?  Taking antibiotic medicine.  Taking other medicines.  Drinking enough water.  In some cases, you may need to see a specialist.  Follow these instructions at home:    Medicines  Take over-the-counter and prescription medicines only as told by your doctor.  If you were prescribed an antibiotic medicine, take it as told by your doctor. Do not stop taking it even if you start to feel better.  General instructions  Make sure you:  Pee until your bladder is empty.  Do not hold pee for a long time.  Empty your bladder after sex.  Wipe from front to back after peeing or pooping if you are a female. Use each tissue one time when you wipe.  Drink enough fluid to keep your pee pale yellow.  Keep all follow-up visits.  Contact a doctor if:  You do not get better after 1-2 days.  Your symptoms go away and then come back.  Get help right away if:  You have very bad back pain.  You have very bad pain in your lower belly.  You have a fever.  You have chills.  You feeling like you will vomit or you vomit.  Summary  A urinary tract infection (UTI) is an infection of any part of the urinary tract.  This condition is caused by germs in your genital area.  There are many risk factors for a UTI.  Treatment includes antibiotic medicines.  Drink enough fluid to keep your pee pale yellow.  This information is not intended to replace advice given to you by your health care provider. Make sure you discuss any questions you have with your health care provider.  Document Revised: 07/30/2021 Document Reviewed: 07/30/2021  Elsevier Patient Education © 2023 [employer]

## 2024-01-13 LAB
BACTERIA UR CULT: NORMAL
SIGNIFICANT IND 70042: NORMAL
SITE SITE: NORMAL
SOURCE SOURCE: NORMAL

## 2024-02-21 ENCOUNTER — HOSPITAL ENCOUNTER (OUTPATIENT)
Dept: RADIOLOGY | Facility: MEDICAL CENTER | Age: 79
End: 2024-02-21
Payer: MEDICARE

## 2024-02-22 ENCOUNTER — OFFICE VISIT (OUTPATIENT)
Dept: INTERNAL MEDICINE | Facility: IMAGING CENTER | Age: 79
End: 2024-02-22
Payer: MEDICARE

## 2024-02-22 ENCOUNTER — HOSPITAL ENCOUNTER (OUTPATIENT)
Dept: RADIOLOGY | Facility: MEDICAL CENTER | Age: 79
End: 2024-02-22
Attending: FAMILY MEDICINE
Payer: MEDICARE

## 2024-02-22 VITALS
RESPIRATION RATE: 14 BRPM | DIASTOLIC BLOOD PRESSURE: 68 MMHG | OXYGEN SATURATION: 96 % | HEART RATE: 77 BPM | TEMPERATURE: 98.1 F | HEIGHT: 66 IN | SYSTOLIC BLOOD PRESSURE: 124 MMHG | BODY MASS INDEX: 22.61 KG/M2

## 2024-02-22 DIAGNOSIS — R07.89 CHEST PRESSURE: ICD-10-CM

## 2024-02-22 DIAGNOSIS — E78.00 PURE HYPERCHOLESTEROLEMIA: Chronic | ICD-10-CM

## 2024-02-22 DIAGNOSIS — I10 ESSENTIAL HYPERTENSION, BENIGN: Chronic | ICD-10-CM

## 2024-02-22 DIAGNOSIS — R13.10 DYSPHAGIA, UNSPECIFIED TYPE: ICD-10-CM

## 2024-02-22 PROCEDURE — 71046 X-RAY EXAM CHEST 2 VIEWS: CPT

## 2024-02-22 PROCEDURE — 3074F SYST BP LT 130 MM HG: CPT | Performed by: FAMILY MEDICINE

## 2024-02-22 PROCEDURE — 93000 ELECTROCARDIOGRAM COMPLETE: CPT | Performed by: FAMILY MEDICINE

## 2024-02-22 PROCEDURE — 3078F DIAST BP <80 MM HG: CPT | Performed by: FAMILY MEDICINE

## 2024-02-22 PROCEDURE — 99213 OFFICE O/P EST LOW 20 MIN: CPT | Performed by: FAMILY MEDICINE

## 2024-02-23 NOTE — PROGRESS NOTES
Verbal consent was acquired by the patient to use [employer] ambient listening note generation during this visit      Patient is a 79 y.o.female.established patient who presents today      History of Present Illness  The patient presents for evaluation of multiple medical concerns.    She was having same symptoms 10 years ago andhad cardiac w/u, and everything was fine at that time.   Yesterday, she had an episode which reminded her of the episodes she had before. She had the usual thing done a couple of days ago and also the usual thing with her esophagus. She has the exact memory of these episodes. With the heart issue, it was scary.   With the esophagus issue and the food getting stuck, it was not scary, but it was miserably uncomfortable.     She does not know how many times she has had this chest  heaviness. She got used to it. She would lay down and usually in an hour, she would get up and it would be gone. One time, she was up playing golf and 1 time, she and her  were taking a daily walk , which they do all the time.   They are pretty fast walkers.   There are some hills, but she never had any problem with anything like that. Even the elliptical, about half way, she was getting that thing again.     Her  has taken her to the ER a couple of times. They did an EKG, and it was normal. She denies any associated symptoms such as shortness of breath, dizziness, or arm or leg numbness or tingling.     One time, she thought it was her shoulder while playing golf, but it was not a good sign with her heart. She does take cholesterol medication.     She describes the heaviness. It is not a burning or sharp pain.   Every once in a while, she has had a little pain, but it is enough to make her stop.   She thinks it could be psychological.   She does have h/o night palpitations-.     Last night, she could not sleep on her left side 2t palp..   She does not feel them during the day or when she is exercising.  "When she turns on her left side, sometimes she can feel the palpitations. It is not painful, but it keeps her awake. She denies any nausea or burning down below. She has not noticed any strange swelling in her legs. She has a history of trouble swallowing. She has heartburn, and she was suggested to take Pepcid twice a day, which she has been taking, which has been very helpful. She has only taken 2 Tums at night 3 or 4 times before she goes to bed.     Several years ago, she had some episodes, and it has continued very, not often. The last time it happened was around 12/18/2023. She is with people when it happens now because she is more aware of it. The food gets caught somewhere down the line. She has learned not to try water because the water just backs up and sits there. It takes a little bit longer instead of helping it long. She takes her napkin and  spit out her saliva. She has never been told she has a hiatal hernia.     She denies any family history of diabetes, hypertension, smoking, family history of MI at a young age, or women before menopause.     She has cysts in her liver. She had a benign polyp.            /68   Pulse 77   Temp 36.7 °C (98.1 °F)   Resp 14   Ht 1.664 m (5' 5.51\")   SpO2 96% , Body mass index is 22.61 kg/m².    Physical Exam      Physical Exam  Constitutional:       General: She is not in acute distress.     Appearance: Normal appearance. She is not ill-appearing, toxic-appearing or diaphoretic.   HENT:      Head: Normocephalic and atraumatic.   Eyes:      Conjunctiva/sclera: Conjunctivae normal.   Cardiovascular:      Rate and Rhythm: Normal rate and regular rhythm.      Heart sounds: Normal heart sounds.   Pulmonary:      Effort: Pulmonary effort is normal.      Breath sounds: Normal breath sounds.   Abdominal:      General: Abdomen is flat.      Palpations: There is no mass.      Tenderness: There is no abdominal tenderness.   Musculoskeletal:         General: No " tenderness.      Cervical back: Neck supple.      Comments: No chest wall tenderness to palpation   Neurological:      Mental Status: She is alert and oriented to person, place, and time. Mental status is at baseline.   Psychiatric:         Mood and Affect: Mood normal.         Behavior: Behavior normal.         Thought Content: Thought content normal.         Judgment: Judgment normal.         Results  Imaging  Nl CT scan of the abdomen from 2023 was reviewed with the patient.    Testing  Nl EKG was reviewed with the patient.          Assessment & Plan  1. Chest pressure.  While we are leaning heavily towards it being more gastrointestinal, I think it is worth doing an investigation unless it gets worse. I will order a chest x-ray to rule out hiatal hernia, CAC score . I will order a stress test. She will call cardiology for the stress test. She can take Mylanta when she has the heaviness. If her symptoms worsen, she will go to the ER.    2. Dysphagia.  I will refer her to Dr. Sullivan for a consult.    3. Heartburn.  She can take Pepcid twice a day.    4. Colon cancer screening.  She had a benign polyp 10 years ago. I will place an official referral to Dr. Sullivan for a colonoscopy.              This note was created using voice recognition software (Dragon). The accuracy of the dictation is limited by the abilities of the software. I have reviewed the note prior to signing, however some errors in grammar and context are still possible. If you have any questions related to this note please do not hesitate to contact our office.

## 2024-02-26 ENCOUNTER — HOSPITAL ENCOUNTER (OUTPATIENT)
Dept: RADIOLOGY | Facility: MEDICAL CENTER | Age: 79
End: 2024-02-26
Attending: FAMILY MEDICINE
Payer: COMMERCIAL

## 2024-02-26 DIAGNOSIS — R07.89 CHEST PRESSURE: ICD-10-CM

## 2024-02-26 PROCEDURE — 4410556 CT-CARDIAC SCORING (SELF PAY ONLY)

## 2024-02-27 ENCOUNTER — APPOINTMENT (RX ONLY)
Dept: URBAN - METROPOLITAN AREA CLINIC 6 | Facility: CLINIC | Age: 79
Setting detail: DERMATOLOGY
End: 2024-02-27

## 2024-02-27 ENCOUNTER — HOSPITAL ENCOUNTER (OUTPATIENT)
Dept: RADIOLOGY | Facility: MEDICAL CENTER | Age: 79
End: 2024-02-27
Attending: FAMILY MEDICINE
Payer: MEDICARE

## 2024-02-27 DIAGNOSIS — L82.1 OTHER SEBORRHEIC KERATOSIS: ICD-10-CM

## 2024-02-27 DIAGNOSIS — R07.89 CHEST PRESSURE: ICD-10-CM

## 2024-02-27 DIAGNOSIS — D18.0 HEMANGIOMA: ICD-10-CM

## 2024-02-27 DIAGNOSIS — L81.4 OTHER MELANIN HYPERPIGMENTATION: ICD-10-CM

## 2024-02-27 DIAGNOSIS — L259 CONTACT DERMATITIS AND OTHER ECZEMA, UNSPECIFIED CAUSE: ICD-10-CM | Status: INADEQUATELY CONTROLLED

## 2024-02-27 DIAGNOSIS — D485 NEOPLASM OF UNCERTAIN BEHAVIOR OF SKIN: ICD-10-CM

## 2024-02-27 DIAGNOSIS — D22 MELANOCYTIC NEVI: ICD-10-CM

## 2024-02-27 DIAGNOSIS — Z71.89 OTHER SPECIFIED COUNSELING: ICD-10-CM

## 2024-02-27 PROBLEM — D48.5 NEOPLASM OF UNCERTAIN BEHAVIOR OF SKIN: Status: ACTIVE | Noted: 2024-02-27

## 2024-02-27 PROBLEM — L30.8 OTHER SPECIFIED DERMATITIS: Status: ACTIVE | Noted: 2024-02-27

## 2024-02-27 PROBLEM — D22.62 MELANOCYTIC NEVI OF LEFT UPPER LIMB, INCLUDING SHOULDER: Status: ACTIVE | Noted: 2024-02-27

## 2024-02-27 PROBLEM — D22.5 MELANOCYTIC NEVI OF TRUNK: Status: ACTIVE | Noted: 2024-02-27

## 2024-02-27 PROBLEM — D18.01 HEMANGIOMA OF SKIN AND SUBCUTANEOUS TISSUE: Status: ACTIVE | Noted: 2024-02-27

## 2024-02-27 PROBLEM — D22.61 MELANOCYTIC NEVI OF RIGHT UPPER LIMB, INCLUDING SHOULDER: Status: ACTIVE | Noted: 2024-02-27

## 2024-02-27 PROCEDURE — ? COUNSELING

## 2024-02-27 PROCEDURE — 78452 HT MUSCLE IMAGE SPECT MULT: CPT | Mod: 26 | Performed by: INTERNAL MEDICINE

## 2024-02-27 PROCEDURE — 93018 CV STRESS TEST I&R ONLY: CPT | Performed by: INTERNAL MEDICINE

## 2024-02-27 PROCEDURE — ? MEDICATION COUNSELING

## 2024-02-27 PROCEDURE — 700111 HCHG RX REV CODE 636 W/ 250 OVERRIDE (IP): Performed by: FAMILY MEDICINE

## 2024-02-27 PROCEDURE — 99213 OFFICE O/P EST LOW 20 MIN: CPT | Mod: 25

## 2024-02-27 PROCEDURE — 11102 TANGNTL BX SKIN SINGLE LES: CPT

## 2024-02-27 PROCEDURE — ? BIOPSY BY SHAVE METHOD

## 2024-02-27 PROCEDURE — A9502 TC99M TETROFOSMIN: HCPCS

## 2024-02-27 PROCEDURE — ? PRESCRIPTION

## 2024-02-27 PROCEDURE — ? TREATMENT REGIMEN

## 2024-02-27 RX ORDER — AMINOPHYLLINE 25 MG/ML
100 INJECTION, SOLUTION INTRAVENOUS
Status: DISCONTINUED | OUTPATIENT
Start: 2024-02-27 | End: 2024-02-28 | Stop reason: HOSPADM

## 2024-02-27 RX ORDER — REGADENOSON 0.08 MG/ML
0.4 INJECTION, SOLUTION INTRAVENOUS ONCE
Status: COMPLETED | OUTPATIENT
Start: 2024-02-27 | End: 2024-02-27

## 2024-02-27 RX ORDER — TRIAMCINOLONE ACETONIDE 1 MG/G
CREAM TOPICAL BID
Qty: 160 | Refills: 3 | Status: ERX | COMMUNITY
Start: 2024-02-27

## 2024-02-27 RX ADMIN — TRIAMCINOLONE ACETONIDE THIN LAYER: 1 CREAM TOPICAL at 00:00

## 2024-02-27 RX ADMIN — REGADENOSON 0.4 MG: 0.08 INJECTION, SOLUTION INTRAVENOUS at 10:36

## 2024-02-27 ASSESSMENT — LOCATION SIMPLE DESCRIPTION DERM
LOCATION SIMPLE: LEFT CHEEK
LOCATION SIMPLE: RIGHT FOREARM
LOCATION SIMPLE: RIGHT UPPER ARM
LOCATION SIMPLE: CHEST
LOCATION SIMPLE: LEFT NOSE
LOCATION SIMPLE: LEFT UPPER BACK
LOCATION SIMPLE: LEFT ANTERIOR NECK
LOCATION SIMPLE: LEFT UPPER ARM
LOCATION SIMPLE: LEFT FOREARM
LOCATION SIMPLE: RIGHT UPPER BACK
LOCATION SIMPLE: RIGHT LOWER BACK

## 2024-02-27 ASSESSMENT — LOCATION DETAILED DESCRIPTION DERM
LOCATION DETAILED: RIGHT SUPERIOR LATERAL MIDBACK
LOCATION DETAILED: LEFT CENTRAL MALAR CHEEK
LOCATION DETAILED: RIGHT MEDIAL INFERIOR CHEST
LOCATION DETAILED: LEFT INFERIOR ANTERIOR NECK
LOCATION DETAILED: LEFT INFERIOR UPPER BACK
LOCATION DETAILED: LEFT VENTRAL DISTAL FOREARM
LOCATION DETAILED: RIGHT MID-UPPER BACK
LOCATION DETAILED: MIDDLE STERNUM
LOCATION DETAILED: LEFT PROXIMAL DORSAL FOREARM
LOCATION DETAILED: RIGHT VENTRAL PROXIMAL FOREARM
LOCATION DETAILED: LEFT ANTERIOR DISTAL UPPER ARM
LOCATION DETAILED: RIGHT PROXIMAL DORSAL FOREARM
LOCATION DETAILED: LEFT NASAL ALA
LOCATION DETAILED: RIGHT ANTERIOR DISTAL UPPER ARM
LOCATION DETAILED: RIGHT VENTRAL DISTAL FOREARM

## 2024-02-27 ASSESSMENT — LOCATION ZONE DERM
LOCATION ZONE: ARM
LOCATION ZONE: NOSE
LOCATION ZONE: FACE
LOCATION ZONE: TRUNK
LOCATION ZONE: NECK

## 2024-02-27 NOTE — PROCEDURE: MEDICATION COUNSELING
Prednisone Pregnancy And Lactation Text: This medication is Pregnancy Category C and it isn't know if it is safe during pregnancy. This medication is excreted in breast milk.
Xeljanz Counseling: I discussed with the patient the risks of Xeljanz therapy including increased risk of infection, liver issues, headache, diarrhea, or cold symptoms. Live vaccines should be avoided. They were instructed to call if they have any problems.
Mirvaso Counseling: Mirvaso is a topical medication which can decrease superficial blood flow where applied. Side effects are uncommon and include stinging, redness and allergic reactions.
Finasteride Male Counseling: Finasteride Counseling:  I discussed with the patient the risks of use of finasteride including but not limited to decreased libido, decreased ejaculate volume, gynecomastia, and depression. Women should not handle medication.  All of the patient's questions and concerns were addressed.
High Dose Vitamin A Pregnancy And Lactation Text: High dose vitamin A therapy is contraindicated during pregnancy and breast feeding.
Eucrisa Pregnancy And Lactation Text: This medication has not been assigned a Pregnancy Risk Category but animal studies failed to show danger with the topical medication. It is unknown if the medication is excreted in breast milk.
Cimetidine Pregnancy And Lactation Text: This medication is Pregnancy Category B and is considered safe during pregnancy. It is also excreted in breast milk and breast feeding isn't recommended.
Tazorac Pregnancy And Lactation Text: This medication is not safe during pregnancy. It is unknown if this medication is excreted in breast milk.
Griseofulvin Counseling:  I discussed with the patient the risks of griseofulvin including but not limited to photosensitivity, cytopenia, liver damage, nausea/vomiting and severe allergy.  The patient understands that this medication is best absorbed when taken with a fatty meal (e.g., ice cream or french fries).
Erivedge Counseling- I discussed with the patient the risks of Erivedge including but not limited to nausea, vomiting, diarrhea, constipation, weight loss, changes in the sense of taste, decreased appetite, muscle spasms, and hair loss.  The patient verbalized understanding of the proper use and possible adverse effects of Erivedge.  All of the patient's questions and concerns were addressed.
Quinolones Pregnancy And Lactation Text: This medication is Pregnancy Category C and it isn't know if it is safe during pregnancy. It is also excreted in breast milk.
Skyrizi Pregnancy And Lactation Text: The risk during pregnancy and breastfeeding is uncertain with this medication.
Topical Steroids Counseling: I discussed with the patient that prolonged use of topical steroids can result in the increased appearance of superficial blood vessels (telangiectasias), lightening (hypopigmentation) and thinning of the skin (atrophy).  Patient understands to avoid using high potency steroids in skin folds, the groin or the face.  The patient verbalized understanding of the proper use and possible adverse effects of topical steroids.  All of the patient's questions and concerns were addressed.
Glycopyrrolate Pregnancy And Lactation Text: This medication is Pregnancy Category B and is considered safe during pregnancy. It is unknown if it is excreted breast milk.
Infliximab Counseling:  I discussed with the patient the risks of infliximab including but not limited to myelosuppression, immunosuppression, autoimmune hepatitis, demyelinating diseases, lymphoma, and serious infections.  The patient understands that monitoring is required including a PPD at baseline and must alert us or the primary physician if symptoms of infection or other concerning signs are noted.
Clofazimine Counseling:  I discussed with the patient the risks of clofazimine including but not limited to skin and eye pigmentation, liver damage, nausea/vomiting, gastrointestinal bleeding and allergy.
Rhofade Counseling: Rhofade is a topical medication which can decrease superficial blood flow where applied. Side effects are uncommon and include stinging, redness and allergic reactions.
Doxycycline Counseling:  Patient counseled regarding possible photosensitivity and increased risk for sunburn.  Patient instructed to avoid sunlight, if possible.  When exposed to sunlight, patients should wear protective clothing, sunglasses, and sunscreen.  The patient was instructed to call the office immediately if the following severe adverse effects occur:  hearing changes, easy bruising/bleeding, severe headache, or vision changes.  The patient verbalized understanding of the proper use and possible adverse effects of doxycycline.  All of the patient's questions and concerns were addressed.
Adbry Counseling: I discussed with the patient the risks of tralokinumab including but not limited to eye infection and irritation, cold sores, injection site reactions, worsening of asthma, allergic reactions and increased risk of parasitic infection.  Live vaccines should be avoided while taking tralokinumab. The patient understands that monitoring is required and they must alert us or the primary physician if symptoms of infection or other concerning signs are noted.
Xelericaz Pregnancy And Lactation Text: This medication is Pregnancy Category D and is not considered safe during pregnancy.  The risk during breast feeding is also uncertain.
Zoryve Counseling:  I discussed with the patient that Zoryve is not for use in the eyes, mouth or vagina. The most commonly reported side effects include diarrhea, headache, insomnia, application site pain, upper respiratory tract infections, and urinary tract infections.  All of the patient's questions and concerns were addressed.
SSKI Counseling:  I discussed with the patient the risks of SSKI including but not limited to thyroid abnormalities, metallic taste, GI upset, fever, headache, acne, arthralgias, paraesthesias, lymphadenopathy, easy bleeding, arrhythmias, and allergic reaction.
Mirvaso Pregnancy And Lactation Text: This medication has not been assigned a Pregnancy Risk Category. It is unknown if the medication is excreted in breast milk.
Cellcept Counseling:  I discussed with the patient the risks of mycophenolate mofetil including but not limited to infection/immunosuppression, GI upset, hypokalemia, hypercholesterolemia, bone marrow suppression, lymphoproliferative disorders, malignancy, GI ulceration/bleed/perforation, colitis, interstitial lung disease, kidney failure, progressive multifocal leukoencephalopathy, and birth defects.  The patient understands that monitoring is required including a baseline creatinine and regular CBC testing. In addition, patient must alert us immediately if symptoms of infection or other concerning signs are noted.
Calcipotriene Counseling:  I discussed with the patient the risks of calcipotriene including but not limited to erythema, scaling, itching, and irritation.
Olanzapine Pregnancy And Lactation Text: This medication is pregnancy category C.   There are no adequate and well controlled trials with olanzapine in pregnant females.  Olanzapine should be used during pregnancy only if the potential benefit justifies the potential risk to the fetus.   In a study in lactating healthy women, olanzapine was excreted in breast milk.  It is recommended that women taking olanzapine should not breast feed.
Calcipotriene Pregnancy And Lactation Text: The use of this medication during pregnancy or lactation is not recommended as there is insufficient data.
Topical Clindamycin Counseling: Patient counseled that this medication may cause skin irritation or allergic reactions.  In the event of skin irritation, the patient was advised to reduce the amount of the drug applied or use it less frequently.   The patient verbalized understanding of the proper use and possible adverse effects of clindamycin.  All of the patient's questions and concerns were addressed.
Griseofulvin Pregnancy And Lactation Text: This medication is Pregnancy Category X and is known to cause serious birth defects. It is unknown if this medication is excreted in breast milk but breast feeding should be avoided.
Stelara Counseling:  I discussed with the patient the risks of ustekinumab including but not limited to immunosuppression, malignancy, posterior leukoencephalopathy syndrome, and serious infections.  The patient understands that monitoring is required including a PPD at baseline and must alert us or the primary physician if symptoms of infection or other concerning signs are noted.
Rifampin Counseling: I discussed with the patient the risks of rifampin including but not limited to liver damage, kidney damage, red-orange body fluids, nausea/vomiting and severe allergy.
Topical Steroids Applications Pregnancy And Lactation Text: Most topical steroids are considered safe to use during pregnancy and lactation.  Any topical steroid applied to the breast or nipple should be washed off before breastfeeding.
Sski Pregnancy And Lactation Text: This medication is Pregnancy Category D and isn't considered safe during pregnancy. It is excreted in breast milk.
Finasteride Female Counseling: Finasteride Counseling:  I discussed with the patient the risks of use of finasteride including but not limited to decreased libido and sexual dysfunction. Explained the teratogenic nature of the medication and stressed the importance of not getting pregnant during treatment. All of the patient's questions and concerns were addressed.
Cosentyx Pregnancy And Lactation Text: This medication is Pregnancy Category B and is considered safe during pregnancy. It is unknown if this medication is excreted in breast milk.
Clofazimine Pregnancy And Lactation Text: This medication is Pregnancy Category C and isn't considered safe during pregnancy. It is excreted in breast milk.
Hydroxychloroquine Counseling:  I discussed with the patient that a baseline ophthalmologic exam is needed at the start of therapy and every year thereafter while on therapy. A CBC may also be warranted for monitoring.  The side effects of this medication were discussed with the patient, including but not limited to agranulocytosis, aplastic anemia, seizures, rashes, retinopathy, and liver toxicity. Patient instructed to call the office should any adverse effect occur.  The patient verbalized understanding of the proper use and possible adverse effects of Plaquenil.  All the patient's questions and concerns were addressed.
Hydroquinone Counseling:  Patient advised that medication may result in skin irritation, lightening (hypopigmentation), dryness, and burning.  In the event of skin irritation, the patient was advised to reduce the amount of the drug applied or use it less frequently.  Rarely, spots that are treated with hydroquinone can become darker (pseudoochronosis).  Should this occur, patient instructed to stop medication and call the office. The patient verbalized understanding of the proper use and possible adverse effects of hydroquinone.  All of the patient's questions and concerns were addressed.
Erivedge Pregnancy And Lactation Text: This medication is Pregnancy Category X and is absolutely contraindicated during pregnancy. It is unknown if it is excreted in breast milk.
Doxepin Counseling:  Patient advised that the medication is sedating and not to drive a car after taking this medication. Patient informed of potential adverse effects including but not limited to dry mouth, urinary retention, and blurry vision.  The patient verbalized understanding of the proper use and possible adverse effects of doxepin.  All of the patient's questions and concerns were addressed.
Opzelura Counseling:  I discussed with the patient the risks of Opzelura including but not limited to nasopharngitis, bronchitis, ear infection, eosinophila, hives, diarrhea, folliculitis, tonsillitis, and rhinorrhea.  Taken orally, this medication has been linked to serious infections; higher rate of mortality; malignancy and lymphoproliferative disorders; major adverse cardiovascular events; thrombosis; thrombocytopenia, anemia, and neutropenia; and lipid elevations.
Azithromycin Counseling:  I discussed with the patient the risks of azithromycin including but not limited to GI upset, allergic reaction, drug rash, diarrhea, and yeast infections.
Hydroxychloroquine Pregnancy And Lactation Text: This medication has been shown to cause fetal harm but it isn't assigned a Pregnancy Risk Category. There are small amounts excreted in breast milk.
Cantharidin Counseling:  I discussed with the patient the risks of Cantharidin including but not limited to pain, redness, burning, itching, and blistering.
Doxycycline Pregnancy And Lactation Text: This medication is Pregnancy Category D and not consider safe during pregnancy. It is also excreted in breast milk but is considered safe for shorter treatment courses.
Cellcept Pregnancy And Lactation Text: This medication is Pregnancy Category D and isn't considered safe during pregnancy. It is unknown if this medication is excreted in breast milk.
Aklief counseling:  Patient advised to apply a pea-sized amount only at bedtime and wait 30 minutes after washing their face before applying.  If too drying, patient may add a non-comedogenic moisturizer.  The most commonly reported side effects including irritation, redness, scaling, dryness, stinging, burning, itching, and increased risk of sunburn.  The patient verbalized understanding of the proper use and possible adverse effects of retinoids.  All of the patient's questions and concerns were addressed.
Oral Minoxidil Counseling- I discussed with the patient the risks of oral minoxidil including but not limited to shortness of breath, swelling of the feet or ankles, dizziness, lightheadedness, unwanted hair growth and allergic reaction.  The patient verbalized understanding of the proper use and possible adverse effects of oral minoxidil.  All of the patient's questions and concerns were addressed.
Cibinqo Counseling: I discussed with the patient the risks of Cibinqo therapy including but not limited to common cold, nausea, headache, cold sores, increased blood CPK levels, dizziness, UTIs, fatigue, acne, and vomitting. Live vaccines should be avoided.  This medication has been linked to serious infections; higher rate of mortality; malignancy and lymphoproliferative disorders; major adverse cardiovascular events; thrombosis; thrombocytopenia and lymphopenia; lipid elevations; and retinal detachment.
Dupixent Counseling: I discussed with the patient the risks of dupilumab including but not limited to eye infection and irritation, cold sores, injection site reactions, worsening of asthma, allergic reactions and increased risk of parasitic infection.  Live vaccines should be avoided while taking dupilumab. Dupilumab will also interact with certain medications such as warfarin and cyclosporine. The patient understands that monitoring is required and they must alert us or the primary physician if symptoms of infection or other concerning signs are noted.
Zoryve Pregnancy And Lactation Text: It is unknown if this medication can cause problems during pregnancy and breastfeeding.
Adbry Pregnancy And Lactation Text: It is unknown if this medication will adversely affect pregnancy or breast feeding.
Finasteride Pregnancy And Lactation Text: This medication is absolutely contraindicated during pregnancy. It is unknown if it is excreted in breast milk.
Doxepin Pregnancy And Lactation Text: This medication is Pregnancy Category C and it isn't known if it is safe during pregnancy. It is also excreted in breast milk and breast feeding isn't recommended.
Cyclophosphamide Counseling:  I discussed with the patient the risks of cyclophosphamide including but not limited to hair loss, hormonal abnormalities, decreased fertility, abdominal pain, diarrhea, nausea and vomiting, bone marrow suppression and infection. The patient understands that monitoring is required while taking this medication.
Topical Sulfur Applications Counseling: Topical Sulfur Counseling: Patient counseled that this medication may cause skin irritation or allergic reactions.  In the event of skin irritation, the patient was advised to reduce the amount of the drug applied or use it less frequently.   The patient verbalized understanding of the proper use and possible adverse effects of topical sulfur application.  All of the patient's questions and concerns were addressed.
Oral Minoxidil Pregnancy And Lactation Text: This medication should only be used when clearly needed if you are pregnant, attempting to become pregnant or breast feeding.
Itraconazole Counseling:  I discussed with the patient the risks of itraconazole including but not limited to liver damage, nausea/vomiting, neuropathy, and severe allergy.  The patient understands that this medication is best absorbed when taken with acidic beverages such as non-diet cola or ginger ale.  The patient understands that monitoring is required including baseline LFTs and repeat LFTs at intervals.  The patient understands that they are to contact us or the primary physician if concerning signs are noted.
Topical Clindamycin Pregnancy And Lactation Text: This medication is Pregnancy Category B and is considered safe during pregnancy. It is unknown if it is excreted in breast milk.
Cibinqo Pregnancy And Lactation Text: It is unknown if this medication will adversely affect pregnancy or breast feeding.  You should not take this medication if you are currently pregnant or planning a pregnancy or while breastfeeding.
Thalidomide Counseling: I discussed with the patient the risks of thalidomide including but not limited to birth defects, anxiety, weakness, chest pain, dizziness, cough and severe allergy.
Rifampin Pregnancy And Lactation Text: This medication is Pregnancy Category C and it isn't know if it is safe during pregnancy. It is also excreted in breast milk and should not be used if you are breast feeding.
Cantharidin Pregnancy And Lactation Text: This medication has not been proven safe during pregnancy. It is unknown if this medication is excreted in breast milk.
5-Fu Counseling: 5-Fluorouracil Counseling:  I discussed with the patient the risks of 5-fluorouracil including but not limited to erythema, scaling, itching, weeping, crusting, and pain.
Solaraze Counseling:  I discussed with the patient the risks of Solaraze including but not limited to erythema, scaling, itching, weeping, crusting, and pain.
Erythromycin Counseling:  I discussed with the patient the risks of erythromycin including but not limited to GI upset, allergic reaction, drug rash, diarrhea, increase in liver enzymes, and yeast infections.
Rituxan Counseling:  I discussed with the patient the risks of Rituxan infusions. Side effects can include infusion reactions, severe drug rashes including mucocutaneous reactions, reactivation of latent hepatitis and other infections and rarely progressive multifocal leukoencephalopathy.  All of the patient's questions and concerns were addressed.
Libtayo Counseling- I discussed with the patient the risks of Libtayo including but not limited to nausea, vomiting, diarrhea, and bone or muscle pain.  The patient verbalized understanding of the proper use and possible adverse effects of Libtayo.  All of the patient's questions and concerns were addressed.
Acitretin Counseling:  I discussed with the patient the risks of acitretin including but not limited to hair loss, dry lips/skin/eyes, liver damage, hyperlipidemia, depression/suicidal ideation, photosensitivity.  Serious rare side effects can include but are not limited to pancreatitis, pseudotumor cerebri, bony changes, clot formation/stroke/heart attack.  Patient understands that alcohol is contraindicated since it can result in liver toxicity and significantly prolong the elimination of the drug by many years.
Colchicine Counseling:  Patient counseled regarding adverse effects including but not limited to stomach upset (nausea, vomiting, stomach pain, or diarrhea).  Patient instructed to limit alcohol consumption while taking this medication.  Colchicine may reduce blood counts especially with prolonged use.  The patient understands that monitoring of kidney function and blood counts may be required, especially at baseline. The patient verbalized understanding of the proper use and possible adverse effects of colchicine.  All of the patient's questions and concerns were addressed.
Dupixent Pregnancy And Lactation Text: This medication likely crosses the placenta but the risk for the fetus is uncertain. This medication is excreted in breast milk.
Bimzelx Counseling:  I discussed with the patient the risks of Bimzelx including but not limited to depression, immunosuppression, allergic reactions and infections.  The patient understands that monitoring is required including a PPD at baseline and must alert us or the primary physician if symptoms of infection or other concerning signs are noted.
Zyclara Counseling:  I discussed with the patient the risks of imiquimod including but not limited to erythema, scaling, itching, weeping, crusting, and pain.  Patient understands that the inflammatory response to imiquimod is variable from person to person and was educated regarded proper titration schedule.  If flu-like symptoms develop, patient knows to discontinue the medication and contact us.
Azithromycin Pregnancy And Lactation Text: This medication is considered safe during pregnancy and is also secreted in breast milk.
Hydroxyzine Counseling: Patient advised that the medication is sedating and not to drive a car after taking this medication.  Patient informed of potential adverse effects including but not limited to dry mouth, urinary retention, and blurry vision.  The patient verbalized understanding of the proper use and possible adverse effects of hydroxyzine.  All of the patient's questions and concerns were addressed.
Topical Sulfur Applications Pregnancy And Lactation Text: This medication is Pregnancy Category C and has an unknown safety profile during pregnancy. It is unknown if this topical medication is excreted in breast milk.
Imiquimod Counseling:  I discussed with the patient the risks of imiquimod including but not limited to erythema, scaling, itching, weeping, crusting, and pain.  Patient understands that the inflammatory response to imiquimod is variable from person to person and was educated regarded proper titration schedule.  If flu-like symptoms develop, patient knows to discontinue the medication and contact us.
Topical Ketoconazole Counseling: Patient counseled that this medication may cause skin irritation or allergic reactions.  In the event of skin irritation, the patient was advised to reduce the amount of the drug applied or use it less frequently.   The patient verbalized understanding of the proper use and possible adverse effects of ketoconazole.  All of the patient's questions and concerns were addressed.
Sarecycline Counseling: Patient advised regarding possible photosensitivity and discoloration of the teeth, skin, lips, tongue and gums.  Patient instructed to avoid sunlight, if possible.  When exposed to sunlight, patients should wear protective clothing, sunglasses, and sunscreen.  The patient was instructed to call the office immediately if the following severe adverse effects occur:  hearing changes, easy bruising/bleeding, severe headache, or vision changes.  The patient verbalized understanding of the proper use and possible adverse effects of sarecycline.  All of the patient's questions and concerns were addressed.
Opzelura Pregnancy And Lactation Text: There is insufficient data to evaluate drug-associated risk for major birth defects, miscarriage, or other adverse maternal or fetal outcomes.  There is a pregnancy registry that monitors pregnancy outcomes in pregnant persons exposed to the medication during pregnancy.  It is unknown if this medication is excreted in breast milk.  Do not breastfeed during treatment and for about 4 weeks after the last dose.
Taltz Counseling: I discussed with the patient the risks of ixekizumab including but not limited to immunosuppression, serious infections, worsening of inflammatory bowel disease and drug reactions.  The patient understands that monitoring is required including a PPD at baseline and must alert us or the primary physician if symptoms of infection or other concerning signs are noted.
Low Dose Naltrexone Counseling- I discussed with the patient the potential risks and side effects of low dose naltrexone including but not limited to: more vivid dreams, headaches, nausea, vomiting, abdominal pain, fatigue, dizziness, and anxiety.
Aklief Pregnancy And Lactation Text: It is unknown if this medication is safe to use during pregnancy.  It is unknown if this medication is excreted in breast milk.  Breastfeeding women should use the topical cream on the smallest area of the skin for the shortest time needed while breastfeeding.  Do not apply to nipple and areola.
Solaraze Pregnancy And Lactation Text: This medication is Pregnancy Category B and is considered safe. There is some data to suggest avoiding during the third trimester. It is unknown if this medication is excreted in breast milk.
Acitretin Pregnancy And Lactation Text: This medication is Pregnancy Category X and should not be given to women who are pregnant or may become pregnant in the future. This medication is excreted in breast milk.
Rituxan Pregnancy And Lactation Text: This medication is Pregnancy Category C and it isn't know if it is safe during pregnancy. It is unknown if this medication is excreted in breast milk but similar antibodies are known to be excreted.
Erythromycin Pregnancy And Lactation Text: This medication is Pregnancy Category B and is considered safe during pregnancy. It is also excreted in breast milk.
Otezla Counseling: The side effects of Otezla were discussed with the patient, including but not limited to worsening or new depression, weight loss, diarrhea, nausea, upper respiratory tract infection, and headache. Patient instructed to call the office should any adverse effect occur.  The patient verbalized understanding of the proper use and possible adverse effects of Otezla.  All the patient's questions and concerns were addressed.
Enbrel Counseling:  I discussed with the patient the risks of etanercept including but not limited to myelosuppression, immunosuppression, autoimmune hepatitis, demyelinating diseases, lymphoma, and infections.  The patient understands that monitoring is required including a PPD at baseline and must alert us or the primary physician if symptoms of infection or other concerning signs are noted.
Libtayo Pregnancy And Lactation Text: This medication is contraindicated in pregnancy and when breast feeding.
Zyclara Pregnancy And Lactation Text: This medication is Pregnancy Category C. It is unknown if this medication is excreted in breast milk.
Bimzelx Pregnancy And Lactation Text: This medication crosses the placenta and the safety is uncertain during pregnancy. It is unknown if this medication is present in breast milk.
Birth Control Pills Counseling: Birth Control Pill Counseling: I discussed with the patient the potential side effects of OCPs including but not limited to increased risk of stroke, heart attack, thrombophlebitis, deep venous thrombosis, hepatic adenomas, breast changes, GI upset, headaches, and depression.  The patient verbalized understanding of the proper use and possible adverse effects of OCPs. All of the patient's questions and concerns were addressed.
Cyclophosphamide Pregnancy And Lactation Text: This medication is Pregnancy Category D and it isn't considered safe during pregnancy. This medication is excreted in breast milk.
5-Fu Pregnancy And Lactation Text: This medication is Pregnancy Category X and contraindicated in pregnancy and in women who may become pregnant. It is unknown if this medication is excreted in breast milk.
Litfulo Counseling: I discussed with the patient the risks of Litfulo therapy including but not limited to upper respiratory tract infections, shingles, cold sores, and nausea. Live vaccines should be avoided.  This medication has been linked to serious infections; higher rate of mortality; malignancy and lymphoproliferative disorders; major adverse cardiovascular events; thrombosis; gastrointestinal perforations; neutropenia; lymphopenia; anemia; liver enzyme elevations; and lipid elevations.
Ketoconazole Counseling:   Patient counseled regarding improving absorption with orange juice.  Adverse effects include but are not limited to breast enlargement, headache, diarrhea, nausea, upset stomach, liver function test abnormalities, taste disturbance, and stomach pain.  There is a rare possibility of liver failure that can occur when taking ketoconazole. The patient understands that monitoring of LFTs may be required, especially at baseline. The patient verbalized understanding of the proper use and possible adverse effects of ketoconazole.  All of the patient's questions and concerns were addressed.
Azelaic Acid Counseling: Patient counseled that medicine may cause skin irritation and to avoid applying near the eyes.  In the event of skin irritation, the patient was advised to reduce the amount of the drug applied or use it less frequently.   The patient verbalized understanding of the proper use and possible adverse effects of azelaic acid.  All of the patient's questions and concerns were addressed.
Sarecycline Pregnancy And Lactation Text: This medication is Pregnancy Category D and not consider safe during pregnancy. It is also excreted in breast milk.
Odomzo Counseling- I discussed with the patient the risks of Odomzo including but not limited to nausea, vomiting, diarrhea, constipation, weight loss, changes in the sense of taste, decreased appetite, muscle spasms, and hair loss.  The patient verbalized understanding of the proper use and possible adverse effects of Odomzo.  All of the patient's questions and concerns were addressed.
Dapsone Counseling: I discussed with the patient the risks of dapsone including but not limited to hemolytic anemia, agranulocytosis, rashes, methemoglobinemia, kidney failure, peripheral neuropathy, headaches, GI upset, and liver toxicity.  Patients who start dapsone require monitoring including baseline LFTs and weekly CBCs for the first month, then every month thereafter.  The patient verbalized understanding of the proper use and possible adverse effects of dapsone.  All of the patient's questions and concerns were addressed.
Picato Counseling:  I discussed with the patient the risks of Picato including but not limited to erythema, scaling, itching, weeping, crusting, and pain.
Birth Control Pills Pregnancy And Lactation Text: This medication should be avoided if pregnant and for the first 30 days post-partum.
Bactrim Counseling:  I discussed with the patient the risks of sulfa antibiotics including but not limited to GI upset, allergic reaction, drug rash, diarrhea, dizziness, photosensitivity, and yeast infections.  Rarely, more serious reactions can occur including but not limited to aplastic anemia, agranulocytosis, methemoglobinemia, blood dyscrasias, liver or kidney failure, lung infiltrates or desquamative/blistering drug rashes.
Low Dose Naltrexone Pregnancy And Lactation Text: Naltrexone is pregnancy category C.  There have been no adequate and well-controlled studies in pregnant women.  It should be used in pregnancy only if the potential benefit justifies the potential risk to the fetus.   Limited data indicates that naltrexone is minimally excreted into breastmilk.
Bexarotene Counseling:  I discussed with the patient the risks of bexarotene including but not limited to hair loss, dry lips/skin/eyes, liver abnormalities, hyperlipidemia, pancreatitis, depression/suicidal ideation, photosensitivity, drug rash/allergic reactions, hypothyroidism, anemia, leukopenia, infection, cataracts, and teratogenicity.  Patient understands that they will need regular blood tests to check lipid profile, liver function tests, white blood cell count, thyroid function tests and pregnancy test if applicable.
Wartpeel Counseling:  I discussed with the patient the risks of Wartpeel including but not limited to erythema, scaling, itching, weeping, crusting, and pain.
Hydroxyzine Pregnancy And Lactation Text: This medication is not safe during pregnancy and should not be taken. It is also excreted in breast milk and breast feeding isn't recommended.
Siliq Counseling:  I discussed with the patient the risks of Siliq including but not limited to new or worsening depression, suicidal thoughts and behavior, immunosuppression, malignancy, posterior leukoencephalopathy syndrome, and serious infections.  The patient understands that monitoring is required including a PPD at baseline and must alert us or the primary physician if symptoms of infection or other concerning signs are noted. There is also a special program designed to monitor depression which is required with Siliq.
Niacinamide Counseling: I recommended taking niacin or niacinamide, also know as vitamin B3, twice daily. Recent evidence suggests that taking vitamin B3 (500 mg twice daily) can reduce the risk of actinic keratoses and non-melanoma skin cancers. Side effects of vitamin B3 include flushing and headache.
Drysol Counseling:  I discussed with the patient the risks of drysol/aluminum chloride including but not limited to skin rash, itching, irritation, burning.
Soolantra Counseling: I discussed with the patients the risks of topial Soolantra. This is a medicine which decreases the number of mites and inflammation in the skin. You experience burning, stinging, eye irritation or allergic reactions.  Please call our office if you develop any problems from using this medication.
Metronidazole Counseling:  I discussed with the patient the risks of metronidazole including but not limited to seizures, nausea/vomiting, a metallic taste in the mouth, nausea/vomiting and severe allergy.
Cyclosporine Counseling:  I discussed with the patient the risks of cyclosporine including but not limited to hypertension, gingival hyperplasia,myelosuppression, immunosuppression, liver damage, kidney damage, neurotoxicity, lymphoma, and serious infections. The patient understands that monitoring is required including baseline blood pressure, CBC, CMP, lipid panel and uric acid, and then 1-2 times monthly CMP and blood pressure.
Azelaic Acid Pregnancy And Lactation Text: This medication is considered safe during pregnancy and breast feeding.
Tetracycline Counseling: Patient counseled regarding possible photosensitivity and increased risk for sunburn.  Patient instructed to avoid sunlight, if possible.  When exposed to sunlight, patients should wear protective clothing, sunglasses, and sunscreen.  The patient was instructed to call the office immediately if the following severe adverse effects occur:  hearing changes, easy bruising/bleeding, severe headache, or vision changes.  The patient verbalized understanding of the proper use and possible adverse effects of tetracycline.  All of the patient's questions and concerns were addressed. Patient understands to avoid pregnancy while on therapy due to potential birth defects.
Cimzia Counseling:  I discussed with the patient the risks of Cimzia including but not limited to immunosuppression, allergic reactions and infections.  The patient understands that monitoring is required including a PPD at baseline and must alert us or the primary physician if symptoms of infection or other concerning signs are noted.
Litfulo Pregnancy And Lactation Text: Based on animal studies, Lifulo may cause embryo-fetal harm when administered to pregnant women.  The medication should not be used in pregnancy.  Breastfeeding is not recommended during treatment.
Tranexamic Acid Counseling:  Patient advised of the small risk of bleeding problems with tranexamic acid. They were also instructed to call if they developed any nausea, vomiting or diarrhea. All of the patient's questions and concerns were addressed.
Bactrim Pregnancy And Lactation Text: This medication is Pregnancy Category D and is known to cause fetal risk.  It is also excreted in breast milk.
Otezla Pregnancy And Lactation Text: This medication is Pregnancy Category C and it isn't known if it is safe during pregnancy. It is unknown if it is excreted in breast milk.
Spironolactone Counseling: Patient advised regarding risks of diarrhea, abdominal pain, hyperkalemia, birth defects (for female patients), liver toxicity and renal toxicity. The patient may need blood work to monitor liver and kidney function and potassium levels while on therapy. The patient verbalized understanding of the proper use and possible adverse effects of spironolactone.  All of the patient's questions and concerns were addressed.
Opioid Counseling: I discussed with the patient the potential side effects of opioids including but not limited to addiction, altered mental status, and depression. I stressed avoiding alcohol, benzodiazepines, muscle relaxants and sleep aids unless specifically okayed by a physician. The patient verbalized understanding of the proper use and possible adverse effects of opioids. All of the patient's questions and concerns were addressed. They were instructed to flush the remaining pills down the toilet if they did not need them for pain.
Tremfya Counseling: I discussed with the patient the risks of guselkumab including but not limited to immunosuppression, serious infections, and drug reactions.  The patient understands that monitoring is required including a PPD at baseline and must alert us or the primary physician if symptoms of infection or other concerning signs are noted.
Olumiant Counseling: I discussed with the patient the risks of Olumiant therapy including but not limited to upper respiratory tract infections, shingles, cold sores, and nausea. Live vaccines should be avoided.  This medication has been linked to serious infections; higher rate of mortality; malignancy and lymphoproliferative disorders; major adverse cardiovascular events; thrombosis; gastrointestinal perforations; neutropenia; lymphopenia; anemia; liver enzyme elevations; and lipid elevations.
Tranexamic Acid Pregnancy And Lactation Text: It is unknown if this medication is safe during pregnancy or breast feeding.
Ketoconazole Pregnancy And Lactation Text: This medication is Pregnancy Category C and it isn't know if it is safe during pregnancy. It is also excreted in breast milk and breast feeding isn't recommended.
Soolantra Pregnancy And Lactation Text: This medication is Pregnancy Category C. This medication is considered safe during breast feeding.
Metronidazole Pregnancy And Lactation Text: This medication is Pregnancy Category B and considered safe during pregnancy.  It is also excreted in breast milk.
Klisyri Counseling:  I discussed with the patient the risks of Klisyri including but not limited to erythema, scaling, itching, weeping, crusting, and pain.
Dapsone Pregnancy And Lactation Text: This medication is Pregnancy Category C and is not considered safe during pregnancy or breast feeding.
Bexarotene Pregnancy And Lactation Text: This medication is Pregnancy Category X and should not be given to women who are pregnant or may become pregnant. This medication should not be used if you are breast feeding.
Cephalexin Counseling: I counseled the patient regarding use of cephalexin as an antibiotic for prophylactic and/or therapeutic purposes. Cephalexin (commonly prescribed under brand name Keflex) is a cephalosporin antibiotic which is active against numerous classes of bacteria, including most skin bacteria. Side effects may include nausea, diarrhea, gastrointestinal upset, rash, hives, yeast infections, and in rare cases, hepatitis, kidney disease, seizures, fever, confusion, neurologic symptoms, and others. Patients with severe allergies to penicillin medications are cautioned that there is about a 10% incidence of cross-reactivity with cephalosporins. When possible, patients with penicillin allergies should use alternatives to cephalosporins for antibiotic therapy.
Albendazole Counseling:  I discussed with the patient the risks of albendazole including but not limited to cytopenia, kidney damage, nausea/vomiting and severe allergy.  The patient understands that this medication is being used in an off-label manner.
Winlevi Counseling:  I discussed with the patient the risks of topical clascoterone including but not limited to erythema, scaling, itching, and stinging. Patient voiced their understanding.
Oxybutynin Counseling:  I discussed with the patient the risks of oxybutynin including but not limited to skin rash, drowsiness, dry mouth, difficulty urinating, and blurred vision.
Terbinafine Counseling: Patient counseling regarding adverse effects of terbinafine including but not limited to headache, diarrhea, rash, upset stomach, liver function test abnormalities, itching, taste/smell disturbance, nausea, abdominal pain, and flatulence.  There is a rare possibility of liver failure that can occur when taking terbinafine.  The patient understands that a baseline LFT and kidney function test may be required. The patient verbalized understanding of the proper use and possible adverse effects of terbinafine.  All of the patient's questions and concerns were addressed.
Humira Counseling:  I discussed with the patient the risks of adalimumab including but not limited to myelosuppression, immunosuppression, autoimmune hepatitis, demyelinating diseases, lymphoma, and serious infections.  The patient understands that monitoring is required including a PPD at baseline and must alert us or the primary physician if symptoms of infection or other concerning signs are noted.
Cimzia Pregnancy And Lactation Text: This medication crosses the placenta but can be considered safe in certain situations. Cimzia may be excreted in breast milk.
Niacinamide Pregnancy And Lactation Text: These medications are considered safe during pregnancy.
Protopic Counseling: Patient may experience a mild burning sensation during topical application. Protopic is not approved in children less than 2 years of age. There have been case reports of hematologic and skin malignancies in patients using topical calcineurin inhibitors although causality is questionable.
Benzoyl Peroxide Counseling: Patient counseled that medicine may cause skin irritation and bleach clothing.  In the event of skin irritation, the patient was advised to reduce the amount of the drug applied or use it less frequently.   The patient verbalized understanding of the proper use and possible adverse effects of benzoyl peroxide.  All of the patient's questions and concerns were addressed.
Topical Retinoid counseling:  Patient advised to apply a pea-sized amount only at bedtime and wait 30 minutes after washing their face before applying.  If too drying, patient may add a non-comedogenic moisturizer. The patient verbalized understanding of the proper use and possible adverse effects of retinoids.  All of the patient's questions and concerns were addressed.
Isotretinoin Counseling: Patient should get monthly blood tests, not donate blood, not drive at night if vision affected, not share medication, and not undergo elective surgery for 6 months after tx completed. Side effects reviewed, pt to contact office should one occur.
Minocycline Counseling: Patient advised regarding possible photosensitivity and discoloration of the teeth, skin, lips, tongue and gums.  Patient instructed to avoid sunlight, if possible.  When exposed to sunlight, patients should wear protective clothing, sunglasses, and sunscreen.  The patient was instructed to call the office immediately if the following severe adverse effects occur:  hearing changes, easy bruising/bleeding, severe headache, or vision changes.  The patient verbalized understanding of the proper use and possible adverse effects of minocycline.  All of the patient's questions and concerns were addressed.
Valtrex Counseling: I discussed with the patient the risks of valacyclovir including but not limited to kidney damage, nausea, vomiting and severe allergy.  The patient understands that if the infection seems to be worsening or is not improving, they are to call.
Olumiant Pregnancy And Lactation Text: Based on animal studies, Olumiant may cause embryo-fetal harm when administered to pregnant women.  The medication should not be used in pregnancy.  Breastfeeding is not recommended during treatment.
Klisyri Pregnancy And Lactation Text: It is unknown if this medication can harm a developing fetus or if it is excreted in breast milk.
Dutasteride Male Counseling: Dustasteride Counseling:  I discussed with the patient the risks of use of dutasteride including but not limited to decreased libido, decreased ejaculate volume, and gynecomastia. Women who can become pregnant should not handle medication.  All of the patient's questions and concerns were addressed.
Rinvoq Pregnancy And Lactation Text: Based on animal studies, Rinvoq may cause embryo-fetal harm when administered to pregnant women.  The medication should not be used in pregnancy.  Breastfeeding is not recommended during treatment and for 6 days after the last dose.
Opioid Pregnancy And Lactation Text: These medications can lead to premature delivery and should be avoided during pregnancy. These medications are also present in breast milk in small amounts.
Elidel Counseling: Patient may experience a mild burning sensation during topical application. Elidel is not approved in children less than 2 years of age. There have been case reports of hematologic and skin malignancies in patients using topical calcineurin inhibitors although causality is questionable.
Gabapentin Counseling: I discussed with the patient the risks of gabapentin including but not limited to dizziness, somnolence, fatigue and ataxia.
Spironolactone Pregnancy And Lactation Text: This medication can cause feminization of the male fetus and should be avoided during pregnancy. The active metabolite is also found in breast milk.
Methotrexate Counseling:  Patient counseled regarding adverse effects of methotrexate including but not limited to nausea, vomiting, abnormalities in liver function tests. Patients may develop mouth sores, rash, diarrhea, and abnormalities in blood counts. The patient understands that monitoring is required including LFT's and blood counts.  There is a rare possibility of scarring of the liver and lung problems that can occur when taking methotrexate. Persistent nausea, loss of appetite, pale stools, dark urine, cough, and shortness of breath should be reported immediately. Patient advised to discontinue methotrexate treatment at least three months before attempting to become pregnant.  I discussed the need for folate supplements while taking methotrexate.  These supplements can decrease side effects during methotrexate treatment. The patient verbalized understanding of the proper use and possible adverse effects of methotrexate.  All of the patient's questions and concerns were addressed.
Simponi Counseling:  I discussed with the patient the risks of golimumab including but not limited to myelosuppression, immunosuppression, autoimmune hepatitis, demyelinating diseases, lymphoma, and serious infections.  The patient understands that monitoring is required including a PPD at baseline and must alert us or the primary physician if symptoms of infection or other concerning signs are noted.
Protopic Pregnancy And Lactation Text: This medication is Pregnancy Category C. It is unknown if this medication is excreted in breast milk when applied topically.
Cosentyx Counseling:  I discussed with the patient the risks of Cosentyx including but not limited to worsening of Crohn's disease, immunosuppression, allergic reactions and infections.  The patient understands that monitoring is required including a PPD at baseline and must alert us or the primary physician if symptoms of infection or other concerning signs are noted.
Benzoyl Peroxide Pregnancy And Lactation Text: This medication is Pregnancy Category C. It is unknown if benzoyl peroxide is excreted in breast milk.
Cephalexin Pregnancy And Lactation Text: This medication is Pregnancy Category B and considered safe during pregnancy.  It is also excreted in breast milk but can be used safely for shorter doses.
Albendazole Pregnancy And Lactation Text: This medication is Pregnancy Category C and it isn't known if it is safe during pregnancy. It is also excreted in breast milk.
Methotrexate Pregnancy And Lactation Text: This medication is Pregnancy Category X and is known to cause fetal harm. This medication is excreted in breast milk.
Isotretinoin Pregnancy And Lactation Text: This medication is Pregnancy Category X and is considered extremely dangerous during pregnancy. It is unknown if it is excreted in breast milk.
Sotyktu Counseling:  I discussed the most common side effects of Sotyktu including: common cold, sore throat, sinus infections, cold sores, canker sores, folliculitis, and acne.  I also discussed more serious side effects of Sotyktu including but not limited to: serious allergic reactions; increased risk for infections such as TB; cancers such as lymphomas; rhabdomyolysis and elevated CPK; and elevated triglycerides and liver enzymes. 
Minoxidil Counseling: Minoxidil is a topical medication which can increase blood flow where it is applied. It is uncertain how this medication increases hair growth. Side effects are uncommon and include stinging and allergic reactions.
Detail Level: Simple
Xolair Counseling:  Patient informed of potential adverse effects including but not limited to fever, muscle aches, rash and allergic reactions.  The patient verbalized understanding of the proper use and possible adverse effects of Xolair.  All of the patient's questions and concerns were addressed.
Winlevi Pregnancy And Lactation Text: This medication is considered safe during pregnancy and breastfeeding.
Nsaids Counseling: NSAID Counseling: I discussed with the patient that NSAIDs should be taken with food. Prolonged use of NSAIDs can result in the development of stomach ulcers.  Patient advised to stop taking NSAIDs if abdominal pain occurs.  The patient verbalized understanding of the proper use and possible adverse effects of NSAIDs.  All of the patient's questions and concerns were addressed.
Valtrex Pregnancy And Lactation Text: this medication is Pregnancy Category B and is considered safe during pregnancy. This medication is not directly found in breast milk but it's metabolite acyclovir is present.
Dutasteride Female Counseling: Dutasteride Counseling:  I discussed with the patient the risks of use of dutasteride including but not limited to decreased libido and sexual dysfunction. Explained the teratogenic nature of the medication and stressed the importance of not getting pregnant during treatment. All of the patient's questions and concerns were addressed.
Azathioprine Counseling:  I discussed with the patient the risks of azathioprine including but not limited to myelosuppression, immunosuppression, hepatotoxicity, lymphoma, and infections.  The patient understands that monitoring is required including baseline LFTs, Creatinine, possible TPMP genotyping and weekly CBCs for the first month and then every 2 weeks thereafter.  The patient verbalized understanding of the proper use and possible adverse effects of azathioprine.  All of the patient's questions and concerns were addressed.
Topical Metronidazole Counseling: Metronidazole is a topical antibiotic medication. You may experience burning, stinging, redness, or allergic reactions.  Please call our office if you develop any problems from using this medication.
Propranolol Counseling:  I discussed with the patient the risks of propranolol including but not limited to low heart rate, low blood pressure, low blood sugar, restlessness and increased cold sensitivity. They should call the office if they experience any of these side effects.
Fluconazole Counseling:  Patient counseled regarding adverse effects of fluconazole including but not limited to headache, diarrhea, nausea, upset stomach, liver function test abnormalities, taste disturbance, and stomach pain.  There is a rare possibility of liver failure that can occur when taking fluconazole.  The patient understands that monitoring of LFTs and kidney function test may be required, especially at baseline. The patient verbalized understanding of the proper use and possible adverse effects of fluconazole.  All of the patient's questions and concerns were addressed.
Carac Counseling:  I discussed with the patient the risks of Carac including but not limited to erythema, scaling, itching, weeping, crusting, and pain.
Arava Counseling:  Patient counseled regarding adverse effects of Arava including but not limited to nausea, vomiting, abnormalities in liver function tests. Patients may develop mouth sores, rash, diarrhea, and abnormalities in blood counts. The patient understands that monitoring is required including LFTs and blood counts.  There is a rare possibility of scarring of the liver and lung problems that can occur when taking methotrexate. Persistent nausea, loss of appetite, pale stools, dark urine, cough, and shortness of breath should be reported immediately. Patient advised to discontinue Arava treatment and consult with a physician prior to attempting conception. The patient will have to undergo a treatment to eliminate Arava from the body prior to conception.
Ivermectin Counseling:  Patient instructed to take medication on an empty stomach with a full glass of water.  Patient informed of potential adverse effects including but not limited to nausea, diarrhea, dizziness, itching, and swelling of the extremities or lymph nodes.  The patient verbalized understanding of the proper use and possible adverse effects of ivermectin.  All of the patient's questions and concerns were addressed.
Qbrexza Counseling:  I discussed with the patient the risks of Qbrexza including but not limited to headache, mydriasis, blurred vision, dry eyes, nasal dryness, dry mouth, dry throat, dry skin, urinary hesitation, and constipation.  Local skin reactions including erythema, burning, stinging, and itching can also occur.
Clindamycin Counseling: I counseled the patient regarding use of clindamycin as an antibiotic for prophylactic and/or therapeutic purposes. Clindamycin is active against numerous classes of bacteria, including skin bacteria. Side effects may include nausea, diarrhea, gastrointestinal upset, rash, hives, yeast infections, and in rare cases, colitis.
Rinvoq Counseling: I discussed with the patient the risks of Rinvoq therapy including but not limited to upper respiratory tract infections, shingles, cold sores, bronchitis, nausea, cough, fever, acne, and headache. Live vaccines should be avoided.  This medication has been linked to serious infections; higher rate of mortality; malignancy and lymphoproliferative disorders; major adverse cardiovascular events; thrombosis; thrombocytopenia, anemia, and neutropenia; lipid elevations; liver enzyme elevations; and gastrointestinal perforations.
Sotyktu Pregnancy And Lactation Text: There is insufficient data to evaluate whether or not Sotyktu is safe to use during pregnancy.   It is not known if Sotyktu passes into breast milk and whether or not it is safe to use when breastfeeding.  
Include Pregnancy/Lactation Warning?: No
Xolair Pregnancy And Lactation Text: This medication is Pregnancy Category B and is considered safe during pregnancy. This medication is excreted in breast milk.
VTAMA Counseling: I discussed with the patient that VTAMA is not for use in the eyes, mouth or mouth. They should call the office if they develop any signs of allergic reactions to VTAMA. The patient verbalized understanding of the proper use and possible adverse effects of VTAMA.  All of the patient's questions and concerns were addressed.
Glycopyrrolate Counseling:  I discussed with the patient the risks of glycopyrrolate including but not limited to skin rash, drowsiness, dry mouth, difficulty urinating, and blurred vision.
Ilumya Counseling: I discussed with the patient the risks of tildrakizumab including but not limited to immunosuppression, malignancy, posterior leukoencephalopathy syndrome, and serious infections.  The patient understands that monitoring is required including a PPD at baseline and must alert us or the primary physician if symptoms of infection or other concerning signs are noted.
Skyrizi Counseling: I discussed with the patient the risks of risankizumab-rzaa including but not limited to immunosuppression, and serious infections.  The patient understands that monitoring is required including a PPD at baseline and must alert us or the primary physician if symptoms of infection or other concerning signs are noted.
Topical Metronidazole Pregnancy And Lactation Text: This medication is Pregnancy Category B and considered safe during pregnancy.  It is also considered safe to use while breastfeeding.
Nsaids Pregnancy And Lactation Text: These medications are considered safe up to 30 weeks gestation. It is excreted in breast milk.
Tazorac Counseling:  Patient advised that medication is irritating and drying.  Patient may need to apply sparingly and wash off after an hour before eventually leaving it on overnight.  The patient verbalized understanding of the proper use and possible adverse effects of tazorac.  All of the patient's questions and concerns were addressed.
Quinolones Counseling:  I discussed with the patient the risks of fluoroquinolones including but not limited to GI upset, allergic reaction, drug rash, diarrhea, dizziness, photosensitivity, yeast infections, liver function test abnormalities, tendonitis/tendon rupture.
High Dose Vitamin A Counseling: Side effects reviewed, pt to contact office should one occur.
Qbrexza Pregnancy And Lactation Text: There is no available data on Qbrexza use in pregnant women.  There is no available data on Qbrexza use in lactation.
Cimetidine Counseling:  I discussed with the patient the risks of Cimetidine including but not limited to gynecomastia, headache, diarrhea, nausea, drowsiness, arrhythmias, pancreatitis, skin rashes, psychosis, bone marrow suppression and kidney toxicity.
Clindamycin Pregnancy And Lactation Text: This medication can be used in pregnancy if certain situations. Clindamycin is also present in breast milk.
Olanzapine Counseling- I discussed with the patient the common side effects of olanzapine including but are not limited to: lack of energy, dry mouth, increased appetite, sleepiness, tremor, constipation, dizziness, changes in behavior, or restlessness.  Explained that teenagers are more likely to experience headaches, abdominal pain, pain in the arms or legs, tiredness, and sleepiness.  Serious side effects include but are not limited: increased risk of death in elderly patients who are confused, have memory loss, or dementia-related psychosis; hyperglycemia; increased cholesterol and triglycerides; and weight gain.
Eucrisa Counseling: Patient may experience a mild burning sensation during topical application. Eucrisa is not approved in children less than 2 years of age.
Prednisone Counseling:  I discussed with the patient the risks of prolonged use of prednisone including but not limited to weight gain, insomnia, osteoporosis, mood changes, diabetes, susceptibility to infection, glaucoma and high blood pressure.  In cases where prednisone use is prolonged, patients should be monitored with blood pressure checks, serum glucose levels and an eye exam.  Additionally, the patient may need to be placed on GI prophylaxis, PCP prophylaxis, and calcium and vitamin D supplementation and/or a bisphosphonate.  The patient verbalized understanding of the proper use and the possible adverse effects of prednisone.  All of the patient's questions and concerns were addressed.
Dutasteride Pregnancy And Lactation Text: This medication is absolutely contraindicated in women, especially during pregnancy and breast feeding. Feminization of male fetuses is possible if taking while pregnant.
Propranolol Pregnancy And Lactation Text: This medication is Pregnancy Category C and it isn't known if it is safe during pregnancy. It is excreted in breast milk.

## 2024-02-27 NOTE — HPI: RASH
What Type Of Note Output Would You Prefer (Optional)?: Standard Output
Is The Patient Presenting As Previously Scheduled?: No, they are coming in before their scheduled appointment
How Severe Is Your Rash?: mild
Is This A New Presentation, Or A Follow-Up?: Rash
Additional History: Patient states she gets hot flashes at night and sweat seems to exacerbate the rash.

## 2024-02-27 NOTE — PROCEDURE: TREATMENT REGIMEN
Initiate Treatment: - TAC twice a day until clear for up to 4 weeks. \\n- Moisturize frequently, recommended CeraVe or Cetaphil.
Plan: Discussed need for biopsy if not improved. Follow up in 2 months.
Detail Level: Zone

## 2024-03-05 ENCOUNTER — TELEPHONE (OUTPATIENT)
Dept: INTERNAL MEDICINE | Facility: IMAGING CENTER | Age: 79
End: 2024-03-05
Payer: MEDICARE

## 2024-03-05 DIAGNOSIS — I49.49 PREMATURE BEATS: ICD-10-CM

## 2024-03-05 DIAGNOSIS — R93.1 AGATSTON CAC SCORE, >400: ICD-10-CM

## 2024-03-05 DIAGNOSIS — R07.89 CHEST PRESSURE: ICD-10-CM

## 2024-03-06 ENCOUNTER — TELEPHONE (OUTPATIENT)
Dept: INTERNAL MEDICINE | Facility: IMAGING CENTER | Age: 79
End: 2024-03-06
Payer: MEDICARE

## 2024-04-16 ENCOUNTER — APPOINTMENT (RX ONLY)
Dept: URBAN - METROPOLITAN AREA CLINIC 6 | Facility: CLINIC | Age: 79
Setting detail: DERMATOLOGY
End: 2024-04-16

## 2024-04-16 DIAGNOSIS — L30.4 ERYTHEMA INTERTRIGO: ICD-10-CM | Status: INADEQUATELY CONTROLLED

## 2024-04-16 PROCEDURE — ? COUNSELING

## 2024-04-16 PROCEDURE — ? TREATMENT REGIMEN

## 2024-04-16 PROCEDURE — 99213 OFFICE O/P EST LOW 20 MIN: CPT

## 2024-04-16 PROCEDURE — ? PRESCRIPTION

## 2024-04-16 RX ORDER — HYDROCORTISONE 25 MG/G
THIN LAYER CREAM TOPICAL BID
Qty: 90 | Refills: 3 | Status: ERX | COMMUNITY
Start: 2024-04-16

## 2024-04-16 RX ORDER — KETOCONAZOLE 20 MG/G
THIN LAYER CREAM TOPICAL BID
Qty: 60 | Refills: 3 | Status: ERX | COMMUNITY
Start: 2024-04-16

## 2024-04-16 RX ADMIN — KETOCONAZOLE THIN LAYER: 20 CREAM TOPICAL at 00:00

## 2024-04-16 RX ADMIN — HYDROCORTISONE THIN LAYER: 25 CREAM TOPICAL at 00:00

## 2024-04-16 ASSESSMENT — LOCATION ZONE DERM: LOCATION ZONE: TRUNK

## 2024-04-16 ASSESSMENT — LOCATION SIMPLE DESCRIPTION DERM
LOCATION SIMPLE: LEFT BREAST
LOCATION SIMPLE: RIGHT BREAST

## 2024-04-16 ASSESSMENT — LOCATION DETAILED DESCRIPTION DERM
LOCATION DETAILED: LEFT INFRAMAMMARY CREASE (INNER QUADRANT)
LOCATION DETAILED: RIGHT INFRAMAMMARY CREASE (INNER QUADRANT)

## 2024-04-16 NOTE — PROCEDURE: TREATMENT REGIMEN
Detail Level: Detailed
Initiate Treatment: Minimize moisture and keep as dry as possible.\\nKetoconazole cream mixed hydrocortisone cream with when flared.

## 2024-04-18 DIAGNOSIS — R93.1 AGATSTON CAC SCORE, >400: ICD-10-CM

## 2024-04-18 RX ORDER — ATORVASTATIN CALCIUM 40 MG/1
40 TABLET, FILM COATED ORAL NIGHTLY
Qty: 90 TABLET | Refills: 1 | Status: SHIPPED | OUTPATIENT
Start: 2024-04-18

## 2024-04-30 ENCOUNTER — OFFICE VISIT (OUTPATIENT)
Dept: CARDIOLOGY | Facility: MEDICAL CENTER | Age: 79
End: 2024-04-30
Attending: FAMILY MEDICINE
Payer: MEDICARE

## 2024-04-30 VITALS
HEIGHT: 66 IN | WEIGHT: 136 LBS | SYSTOLIC BLOOD PRESSURE: 124 MMHG | DIASTOLIC BLOOD PRESSURE: 58 MMHG | HEART RATE: 77 BPM | OXYGEN SATURATION: 95 % | RESPIRATION RATE: 14 BRPM | BODY MASS INDEX: 21.86 KG/M2

## 2024-04-30 DIAGNOSIS — R93.1 ELEVATED CORONARY ARTERY CALCIUM SCORE: ICD-10-CM

## 2024-04-30 DIAGNOSIS — I10 HTN (HYPERTENSION), MALIGNANT: ICD-10-CM

## 2024-04-30 DIAGNOSIS — I25.10 CORONARY ARTERY DISEASE INVOLVING NATIVE CORONARY ARTERY OF NATIVE HEART WITHOUT ANGINA PECTORIS: ICD-10-CM

## 2024-04-30 DIAGNOSIS — E78.00 PURE HYPERCHOLESTEROLEMIA: ICD-10-CM

## 2024-04-30 PROCEDURE — 99212 OFFICE O/P EST SF 10 MIN: CPT | Performed by: INTERNAL MEDICINE

## 2024-04-30 RX ORDER — ASPIRIN 81 MG/1
81 TABLET, CHEWABLE ORAL DAILY
COMMUNITY

## 2024-04-30 ASSESSMENT — ENCOUNTER SYMPTOMS
BRUISES/BLEEDS EASILY: 0
DIZZINESS: 0
SPEECH CHANGE: 0
PND: 0
SENSORY CHANGE: 0
COUGH: 0
SHORTNESS OF BREATH: 0
HEADACHES: 0
EYE PAIN: 0
EYE DISCHARGE: 0
FALLS: 0
NAUSEA: 0
MYALGIAS: 0
FEVER: 0
PALPITATIONS: 0
DOUBLE VISION: 0
CHILLS: 0
BLOOD IN STOOL: 0
WEIGHT LOSS: 0
LOSS OF CONSCIOUSNESS: 0
VOMITING: 0
ORTHOPNEA: 0
ABDOMINAL PAIN: 0
HALLUCINATIONS: 0
BLURRED VISION: 0
CLAUDICATION: 0
DEPRESSION: 0

## 2024-04-30 ASSESSMENT — FIBROSIS 4 INDEX: FIB4 SCORE: 1.5

## 2024-04-30 NOTE — PROGRESS NOTES
Chief Complaint   Patient presents with    Hypertension     Essential hypertension, benign    Hyperlipidemia       Subjective     Yu Kitchen is a 79 y.o. female who presents today for cardiac care due to elevated coronary Ca score.    I have personally interpreted EKG today with patient, there is no evidence of acute coronary syndrome, no evidence of prior infarct, normal HI and QT interval, no significant conduction disease. Sinus rhythm.    Yu Kitchen does not have any history of heart attack arrhythmias in the past. she never had transthoracic echocardiogram, cardiac catheterization or ablations procedure in the past. At this time, she denies having chest pain shortness of breath presyncopal syncopal episodes. she is able to exercise with walking for one to 2 miles without having problems of chest pain or shortness of breath. Patient is also able to climb up at least 2 flights of stairs without having symptoms.    I personally interpreted the images of her stress test which did not show evidence of coronary ischemia.    She does drink ETOH daily.    I have independently interpreted and reviewed blood tests results with patient in clinic which shows LDL level of 104, triglycerides level of 75, GFR of 60, K of 4.2.      Past Medical History:   Diagnosis Date    Acute meniscal tear of knee 06/02/2015    Formatting of this note might be different from the original. Symptoms resolved w/ elliptical exercise, avoiding running    Arthritis     Hypercholesteremia     Hypertension     Pathological fracture due to age-related osteoporosis 08/07/2023    Formatting of this note might be different from the original. Per referral to infusion center on 8/7/23    UTI (urinary tract infection)      Past Surgical History:   Procedure Laterality Date    TONSILLECTOMY       Family History   Problem Relation Age of Onset    Cancer Mother         Breast    Hypertension Mother     Stroke Mother     Cancer Father 72         pancreatic    Cancer Sister         ovarian    No Known Problems Daughter      Social History     Socioeconomic History    Marital status:      Spouse name: Not on file    Number of children: Not on file    Years of education: Not on file    Highest education level: Not on file   Occupational History    Not on file   Tobacco Use    Smoking status: Never    Smokeless tobacco: Never   Substance and Sexual Activity    Alcohol use: Yes     Comment: wine 1x/night    Drug use: Not Currently    Sexual activity: Not on file   Other Topics Concern    Not on file   Social History Narrative    lives with her     She has 1 daughter and 2 granddaughters    Moved here, prior care was at [facility]     Social Determinants of Health     Financial Resource Strain: Not on file   Food Insecurity: Not on file   Transportation Needs: Not on file   Physical Activity: Not on file   Stress: Not on file   Social Connections: Not on file   Intimate Partner Violence: Not on file   Housing Stability: Not on file     Allergies   Allergen Reactions    Procaine      Other reaction(s): Tachycardia     Outpatient Encounter Medications as of 4/30/2024   Medication Sig Dispense Refill    aspirin (ASA) 81 MG Chew Tab chewable tablet Chew 81 mg every day.      atorvastatin (LIPITOR) 40 MG Tab Take 1 Tablet by mouth every evening. 90 Tablet 1    tretinoin (RETIN-A) 0.1 % cream APPLY THIN LAYER TO FACE AFTER CLEANSING STARTING WITH 2 TO 3 TIMES WEEKLY THEN INCREASE TO NIGHTLY AS TOLERATED. USE SUNSCREEN IN THE MORNING      Cholecalciferol (D3 PO) Take 1 Capsule by mouth every day.      Multiple Vitamins-Minerals (PRESERVISION AREDS 2 PO) Take 1 Capsule by mouth every day.      diphenhydrAMINE HCl (ALLERGY MEDICATION PO) Take 0.5 Tablets by mouth every day. (OTC)      ibuprofen (MOTRIN) 200 MG Tab Take 200 mg by mouth every 6 hours as needed. Indications: Pain      lisinopril (PRINIVIL) 5 MG Tab Take 5 mg by mouth every evening.       "Cyanocobalamin (B-12 PO) Take 1 Tablet by mouth every day.       No facility-administered encounter medications on file as of 4/30/2024.     Review of Systems   Constitutional:  Negative for chills, fever, malaise/fatigue and weight loss.   HENT:  Negative for ear discharge, ear pain, hearing loss and nosebleeds.    Eyes:  Negative for blurred vision, double vision, pain and discharge.   Respiratory:  Negative for cough and shortness of breath.    Cardiovascular:  Negative for chest pain, palpitations, orthopnea, claudication, leg swelling and PND.   Gastrointestinal:  Negative for abdominal pain, blood in stool, melena, nausea and vomiting.   Genitourinary:  Negative for dysuria and hematuria.   Musculoskeletal:  Negative for falls, joint pain and myalgias.   Skin:  Negative for itching and rash.   Neurological:  Negative for dizziness, sensory change, speech change, loss of consciousness and headaches.   Endo/Heme/Allergies:  Negative for environmental allergies. Does not bruise/bleed easily.   Psychiatric/Behavioral:  Negative for depression, hallucinations and suicidal ideas.               Objective     /58 (BP Location: Left arm, Patient Position: Sitting, BP Cuff Size: Adult)   Pulse 77   Resp 14   Ht 1.676 m (5' 6\")   Wt 61.7 kg (136 lb)   SpO2 95%   BMI 21.95 kg/m²     Physical Exam  Vitals and nursing note reviewed.   Constitutional:       General: She is not in acute distress.     Appearance: She is not diaphoretic.   HENT:      Head: Normocephalic and atraumatic.      Right Ear: External ear normal.      Left Ear: External ear normal.      Nose: No congestion or rhinorrhea.   Eyes:      General:         Right eye: No discharge.         Left eye: No discharge.   Neck:      Thyroid: No thyromegaly.      Vascular: No JVD.   Cardiovascular:      Rate and Rhythm: Normal rate and regular rhythm.      Pulses: Normal pulses.   Pulmonary:      Effort: No respiratory distress.   Abdominal:      General: " There is no distension.      Tenderness: There is no abdominal tenderness.   Musculoskeletal:         General: No swelling or tenderness.      Right lower leg: No edema.      Left lower leg: No edema.   Skin:     General: Skin is warm and dry.   Neurological:      Mental Status: She is alert and oriented to person, place, and time.      Cranial Nerves: No cranial nerve deficit.   Psychiatric:         Behavior: Behavior normal.                Assessment & Plan     1. Elevated coronary artery calcium score  EC-ECHOCARDIOGRAM COMPLETE W/O CONT      2. Coronary artery disease involving native coronary artery of native heart without angina pectoris  EC-ECHOCARDIOGRAM COMPLETE W/O CONT      3. Pure hypercholesterolemia  EC-ECHOCARDIOGRAM COMPLETE W/O CONT      4. HTN (hypertension), malignant  EC-ECHOCARDIOGRAM COMPLETE W/O CONT          Medical Decision Making: Today's Assessment/Status/Plan:   Blood pressure is well controlled. Continue Lisinopril 5 mg daily.  At this time patient is clinically stable in terms of her cardiac standpoint.  I will order transthoracic echocardiogram to assess for structural abnormalities.  Continue Atorvastatin 40 mg daily.    This visit encounter signifies the visit complexity inherent to evaluation and management associated with medical care services that serve as the continuing focal point for all needed health care services and/or with medical care services that are part of ongoing care related to this patient's single, serious condition, complex cardiac condition.    Marc Escalona M.D.

## 2024-05-23 ENCOUNTER — APPOINTMENT (RX ONLY)
Dept: URBAN - METROPOLITAN AREA CLINIC 15 | Facility: CLINIC | Age: 79
Setting detail: DERMATOLOGY
End: 2024-05-23

## 2024-05-23 DIAGNOSIS — Z41.9 ENCOUNTER FOR PROCEDURE FOR PURPOSES OTHER THAN REMEDYING HEALTH STATE, UNSPECIFIED: ICD-10-CM

## 2024-05-23 PROCEDURE — ? COSMETIC CONSULTATION: AGING FACE

## 2024-05-23 PROCEDURE — ? ADDITIONAL NOTES

## 2024-05-23 NOTE — PROCEDURE: ADDITIONAL NOTES
Render Risk Assessment In Note?: no
Detail Level: Detailed
Additional Notes: Plan for Skinvive and possible Voluma next available filler appointment.

## 2024-06-20 ENCOUNTER — APPOINTMENT (RX ONLY)
Dept: URBAN - METROPOLITAN AREA CLINIC 15 | Facility: CLINIC | Age: 79
Setting detail: DERMATOLOGY
End: 2024-06-20

## 2024-06-20 DIAGNOSIS — Z41.9 ENCOUNTER FOR PROCEDURE FOR PURPOSES OTHER THAN REMEDYING HEALTH STATE, UNSPECIFIED: ICD-10-CM

## 2024-06-20 PROCEDURE — ? SKINVIVE INJECTION

## 2024-06-20 NOTE — PROCEDURE: SKINVIVE INJECTION
Topical Anesthesia?: 15% lidocaine, 5% prilocaine, 0.25% phenenylephrine
Additional Area 1 Volume In Cc: 0
Lot #: [id number]
Map Statment: See Attach Map for Complete Details
Number Of Syringes (Required For Inventory): 1
Use Map Statement For Sites (Optional): No
Detail Level: Detailed
Cheeks Filler Volume In Cc: 0.8
Expiration Date (Month Year): 8/19/24
Nasolabial Folds Filler Volume In Cc: 0.3
Consent: Written consent obtained. Risks include but not limited to bruising, beading, irregular texture, ulceration, infection, allergic reaction, scar formation, incomplete augmentation, temporary nature, procedural pain.
Filler: Skinvive
Procedural Text: The filler was administered to the treatment areas noted above.
Price (Use Numbers Only, No Special Characters Or $): 653
Post-Care Instructions: Patient instructed to apply ice to reduce swelling.
Nasolabial Folds Filler Volume In Cc: 0.2
Cheeks Filler Volume In Cc: 0.7

## 2024-06-24 ENCOUNTER — TELEPHONE (OUTPATIENT)
Dept: CARDIOLOGY | Facility: MEDICAL CENTER | Age: 79
End: 2024-06-24
Payer: MEDICARE

## 2024-06-24 NOTE — LETTER
PROCEDURE/SURGERY CLEARANCE FORM      Encounter Date: 6/24/2024    Patient: Yu Kitchen  YOB: 1945    CARDIOLOGIST:  Marc Escalona M.D.    REFERRING DOCTOR:  No ref. provider found     The following procedure/surgery:  EGD and Colonoscopy with Deep (Propofol) Sedation                                                           PROCEDURE/SURGERY CLEARANCE FORM    Date: 6/26/2024   Patient Name: Yu Kitchen    Dear Surgeon or Proceduralist,      Thank you for your request for cardiac stratification of our mutual patient Yu Kitchen 1945. We have reviewed their [facility] records; and to the best of our understanding this patient has not had stenting, ablation, cardiothoracic surgery or hospitalization for cardiovascular reasons in the past 6 months.  Yu Kitchen has been seen within the past 18 months and is considered to have non-modifiable cardiac risk for this low-risk procedure/surgery. They may proceed from a cardiovascular standpoint and may hold their antiplatelet/anticoagulation as briefly as possible. Please have patient resume this medication when hemodynamically stable to do so.     Aspirin or Prasugrel   - hold 7 days prior to procedure/surgery, resume when hemodynamically stable      Clopidrogrel or Ticagrelor  - hold 7 days for all neurological procedures, hold 5 days prior to all other procedure/surgery,  resume when hemodynamically stable     Warfarin - hold 7 days for all neurological procedures, hold 5 days prior to all other procedure/surgery and coordinate with [facility] ([phone]) INR testing and dose management.      Pradaxa/Xarelto/Eliquis/Savesya - hold 1 day prior to procedure for low bleeding risk procedure, 2 days for high bleeding risk procedure, or consider holding 3 days or longer for patients with reduced kidney function (CrCl <30mL/min) or spinal/cranial surgeries/procedures.      If they have a mechanical heart  valve, please coordinate with [facility] ([phone]) the proper management of their anticoagulant in the periprocedural or perioperative period.      Some patients have higher risk for cardiovascular complications or holding medication. If our patient has had prior complications of holding antiplatelet or anticoagulants in the past and we have seen them after these events, we have addressed these concerns with the patient. They are at an unknown degree of increased risk for recurrent complication.  You may hold anticoagulation/antiplatelets for the procedure or surgery if the benefits of the procedure or surgery outweigh this nonmodifiable risk.      If Yu Kitchen 1945 has new symptoms of heart failure decompensation, unstable arrythmia, or angina please reach out and we will assess the patient.      If you have other patient-specific concerns, please feel free to reach out to the patient's cardiologist directly at [phone].     Thank you,       [facility]          Electronically Signed      MD Erwin Escalona M.D.

## 2024-06-24 NOTE — TELEPHONE ENCOUNTER
Last OV: 04.30.2024  Proposed Surgery: EGD and Colonoscopy with Deep (Propofol) Sedation   Surgery Date: 08.14.2024  Requesting Office Name: [facility]   Fax Number: [phone]  Preference of Location (default is surgery center unless specified by Cardiologist or [facility])  Prior Clearance Addressed: No      Anticoags/Antiplatelets: Aspirin  Anticoags/Antiplatelet managed by Cardiology? YES    Outstanding Cardiac Imaging : Yes  Echo.   Clearance to provider to review  Stent, Cardiac Devices, or Catheterization: No  Ablation, TAVR/Valve (including open heart), Cardioversion: No  Recent Cardiac Hospitalization: No            When: N/A  History (cardiac history):   Past Medical History:   Diagnosis Date    Acute meniscal tear of knee 06/02/2015    Formatting of this note might be different from the original. Symptoms resolved w/ elliptical exercise, avoiding running    Arthritis     Hypercholesteremia     Hypertension     Pathological fracture due to age-related osteoporosis 08/07/2023    Formatting of this note might be different from the original. Per referral to infusion center on 8/7/23    UTI (urinary tract infection)              Surgical Clearance Letter Sent: No Provider to advise.   **Scan clearance request letter into [facility].**

## 2024-07-24 ENCOUNTER — TELEPHONE (OUTPATIENT)
Dept: INTERNAL MEDICINE | Facility: IMAGING CENTER | Age: 79
End: 2024-07-24
Payer: MEDICARE

## 2024-07-29 ENCOUNTER — HOSPITAL ENCOUNTER (OUTPATIENT)
Dept: RADIOLOGY | Facility: MEDICAL CENTER | Age: 79
End: 2024-07-29
Attending: FAMILY MEDICINE
Payer: MEDICARE

## 2024-07-29 DIAGNOSIS — Z78.0 ASYMPTOMATIC POSTMENOPAUSAL STATUS: ICD-10-CM

## 2024-07-29 PROCEDURE — 77080 DXA BONE DENSITY AXIAL: CPT

## 2024-07-30 ENCOUNTER — TELEPHONE (OUTPATIENT)
Dept: INTERNAL MEDICINE | Facility: IMAGING CENTER | Age: 79
End: 2024-07-30
Payer: MEDICARE

## 2024-07-30 DIAGNOSIS — Z79.899 MEDICATION MANAGEMENT: ICD-10-CM

## 2024-08-01 ENCOUNTER — APPOINTMENT (OUTPATIENT)
Dept: INTERNAL MEDICINE | Facility: IMAGING CENTER | Age: 79
End: 2024-08-01
Payer: MEDICARE

## 2024-08-01 ENCOUNTER — HOSPITAL ENCOUNTER (OUTPATIENT)
Facility: MEDICAL CENTER | Age: 79
End: 2024-08-01
Attending: INTERNAL MEDICINE
Payer: MEDICARE

## 2024-08-01 ENCOUNTER — HOSPITAL ENCOUNTER (OUTPATIENT)
Facility: MEDICAL CENTER | Age: 79
End: 2024-08-01
Attending: FAMILY MEDICINE
Payer: MEDICARE

## 2024-08-01 DIAGNOSIS — E78.00 PURE HYPERCHOLESTEROLEMIA: ICD-10-CM

## 2024-08-01 DIAGNOSIS — Z79.899 MEDICATION MANAGEMENT: ICD-10-CM

## 2024-08-01 LAB
25(OH)D3 SERPL-MCNC: 50 NG/ML (ref 30–100)
ALBUMIN SERPL BCP-MCNC: 4.1 G/DL (ref 3.2–4.9)
ALBUMIN/GLOB SERPL: 1.7 G/DL
ALP SERPL-CCNC: 75 U/L (ref 30–99)
ALT SERPL-CCNC: 13 U/L (ref 2–50)
ANION GAP SERPL CALC-SCNC: 13 MMOL/L (ref 7–16)
AST SERPL-CCNC: 15 U/L (ref 12–45)
BASOPHILS # BLD AUTO: 0.5 % (ref 0–1.8)
BASOPHILS # BLD: 0.04 K/UL (ref 0–0.12)
BILIRUB SERPL-MCNC: 0.5 MG/DL (ref 0.1–1.5)
BUN SERPL-MCNC: 21 MG/DL (ref 8–22)
CALCIUM ALBUM COR SERPL-MCNC: 9.3 MG/DL (ref 8.5–10.5)
CALCIUM SERPL-MCNC: 9.4 MG/DL (ref 8.5–10.5)
CHLORIDE SERPL-SCNC: 101 MMOL/L (ref 96–112)
CHOLEST SERPL-MCNC: 214 MG/DL (ref 100–199)
CO2 SERPL-SCNC: 26 MMOL/L (ref 20–33)
CREAT SERPL-MCNC: 0.79 MG/DL (ref 0.5–1.4)
EOSINOPHIL # BLD AUTO: 0.1 K/UL (ref 0–0.51)
EOSINOPHIL NFR BLD: 1.4 % (ref 0–6.9)
ERYTHROCYTE [DISTWIDTH] IN BLOOD BY AUTOMATED COUNT: 42.7 FL (ref 35.9–50)
EST. AVERAGE GLUCOSE BLD GHB EST-MCNC: 123 MG/DL
GFR SERPLBLD CREATININE-BSD FMLA CKD-EPI: 75 ML/MIN/1.73 M 2
GLOBULIN SER CALC-MCNC: 2.4 G/DL (ref 1.9–3.5)
GLUCOSE SERPL-MCNC: 81 MG/DL (ref 65–99)
HBA1C MFR BLD: 5.9 % (ref 4–5.6)
HCT VFR BLD AUTO: 45.7 % (ref 37–47)
HDLC SERPL-MCNC: 51 MG/DL
HGB BLD-MCNC: 15.1 G/DL (ref 12–16)
IMM GRANULOCYTES # BLD AUTO: 0.02 K/UL (ref 0–0.11)
IMM GRANULOCYTES NFR BLD AUTO: 0.3 % (ref 0–0.9)
LDLC SERPL CALC-MCNC: 132 MG/DL
LYMPHOCYTES # BLD AUTO: 1.88 K/UL (ref 1–4.8)
LYMPHOCYTES NFR BLD: 25.8 % (ref 22–41)
MCH RBC QN AUTO: 30.1 PG (ref 27–33)
MCHC RBC AUTO-ENTMCNC: 33 G/DL (ref 32.2–35.5)
MCV RBC AUTO: 91.2 FL (ref 81.4–97.8)
MONOCYTES # BLD AUTO: 0.78 K/UL (ref 0–0.85)
MONOCYTES NFR BLD AUTO: 10.7 % (ref 0–13.4)
NEUTROPHILS # BLD AUTO: 4.48 K/UL (ref 1.82–7.42)
NEUTROPHILS NFR BLD: 61.3 % (ref 44–72)
NRBC # BLD AUTO: 0 K/UL
NRBC BLD-RTO: 0 /100 WBC (ref 0–0.2)
PLATELET # BLD AUTO: 304 K/UL (ref 164–446)
PMV BLD AUTO: 10.3 FL (ref 9–12.9)
POTASSIUM SERPL-SCNC: 4.2 MMOL/L (ref 3.6–5.5)
PROT SERPL-MCNC: 6.5 G/DL (ref 6–8.2)
RBC # BLD AUTO: 5.01 M/UL (ref 4.2–5.4)
SODIUM SERPL-SCNC: 140 MMOL/L (ref 135–145)
TRIGL SERPL-MCNC: 155 MG/DL (ref 0–149)
TSH SERPL-ACNC: 1.95 UIU/ML (ref 0.35–5.5)
VIT B12 SERPL-MCNC: 948 PG/ML (ref 211–911)
WBC # BLD AUTO: 7.3 K/UL (ref 4.8–10.8)

## 2024-08-01 PROCEDURE — 82306 VITAMIN D 25 HYDROXY: CPT

## 2024-08-01 PROCEDURE — 82607 VITAMIN B-12: CPT

## 2024-08-01 PROCEDURE — 80061 LIPID PANEL: CPT

## 2024-08-01 PROCEDURE — 84443 ASSAY THYROID STIM HORMONE: CPT

## 2024-08-01 PROCEDURE — 85025 COMPLETE CBC W/AUTO DIFF WBC: CPT

## 2024-08-01 PROCEDURE — 83695 ASSAY OF LIPOPROTEIN(A): CPT

## 2024-08-01 PROCEDURE — 80053 COMPREHEN METABOLIC PANEL: CPT

## 2024-08-01 PROCEDURE — 83036 HEMOGLOBIN GLYCOSYLATED A1C: CPT

## 2024-08-01 PROCEDURE — 99999 PR NO CHARGE: CPT

## 2024-08-03 LAB — LPA SERPL-MCNC: 19 MG/DL

## 2024-08-05 ENCOUNTER — HOSPITAL ENCOUNTER (OUTPATIENT)
Dept: CARDIOLOGY | Facility: MEDICAL CENTER | Age: 79
End: 2024-08-05
Attending: INTERNAL MEDICINE
Payer: MEDICARE

## 2024-08-05 DIAGNOSIS — I25.10 CORONARY ARTERY DISEASE INVOLVING NATIVE CORONARY ARTERY OF NATIVE HEART WITHOUT ANGINA PECTORIS: ICD-10-CM

## 2024-08-05 DIAGNOSIS — R93.1 ELEVATED CORONARY ARTERY CALCIUM SCORE: ICD-10-CM

## 2024-08-05 DIAGNOSIS — E78.00 PURE HYPERCHOLESTEROLEMIA: ICD-10-CM

## 2024-08-05 DIAGNOSIS — I10 HTN (HYPERTENSION), MALIGNANT: ICD-10-CM

## 2024-08-05 PROCEDURE — 93306 TTE W/DOPPLER COMPLETE: CPT

## 2024-08-06 LAB
LV EJECT FRACT  99904: 69
LV EJECT FRACT MOD 2C 99903: 81.1
LV EJECT FRACT MOD 4C 99902: 55.34
LV EJECT FRACT MOD BP 99901: 69.13

## 2024-08-06 PROCEDURE — 93306 TTE W/DOPPLER COMPLETE: CPT | Mod: 26 | Performed by: INTERNAL MEDICINE

## 2024-08-08 ENCOUNTER — OFFICE VISIT (OUTPATIENT)
Dept: INTERNAL MEDICINE | Facility: IMAGING CENTER | Age: 79
End: 2024-08-08
Payer: MEDICARE

## 2024-08-08 VITALS
HEIGHT: 66 IN | HEART RATE: 79 BPM | BODY MASS INDEX: 21.86 KG/M2 | TEMPERATURE: 96.9 F | RESPIRATION RATE: 14 BRPM | DIASTOLIC BLOOD PRESSURE: 70 MMHG | WEIGHT: 136 LBS | OXYGEN SATURATION: 94 % | SYSTOLIC BLOOD PRESSURE: 110 MMHG

## 2024-08-08 DIAGNOSIS — Z00.00 MEDICARE ANNUAL WELLNESS VISIT, SUBSEQUENT: Primary | ICD-10-CM

## 2024-08-08 DIAGNOSIS — I10 ESSENTIAL HYPERTENSION, BENIGN: Chronic | ICD-10-CM

## 2024-08-08 DIAGNOSIS — R73.09 ELEVATED HEMOGLOBIN A1C: ICD-10-CM

## 2024-08-08 DIAGNOSIS — R92.30 DENSE BREAST TISSUE ON MAMMOGRAM, UNSPECIFIED TYPE: ICD-10-CM

## 2024-08-08 DIAGNOSIS — E78.5 HYPERLIPIDEMIA, UNSPECIFIED HYPERLIPIDEMIA TYPE: Chronic | ICD-10-CM

## 2024-08-08 PROBLEM — R13.10 ODYNOPHAGIA: Status: ACTIVE | Noted: 2024-08-08

## 2024-08-08 PROBLEM — R19.4 CHANGE IN BOWEL HABIT: Status: ACTIVE | Noted: 2024-08-08

## 2024-08-08 PROBLEM — R14.0 BLOATING SYMPTOM: Status: ACTIVE | Noted: 2024-08-08

## 2024-08-08 PROCEDURE — G0439 PPPS, SUBSEQ VISIT: HCPCS | Performed by: FAMILY MEDICINE

## 2024-08-08 PROCEDURE — 3074F SYST BP LT 130 MM HG: CPT | Performed by: FAMILY MEDICINE

## 2024-08-08 PROCEDURE — 3078F DIAST BP <80 MM HG: CPT | Performed by: FAMILY MEDICINE

## 2024-08-08 ASSESSMENT — PATIENT HEALTH QUESTIONNAIRE - PHQ9: CLINICAL INTERPRETATION OF PHQ2 SCORE: 0

## 2024-08-08 ASSESSMENT — ENCOUNTER SYMPTOMS: GENERAL WELL-BEING: GOOD

## 2024-08-08 ASSESSMENT — FIBROSIS 4 INDEX: FIB4 SCORE: 1.11

## 2024-08-08 ASSESSMENT — ACTIVITIES OF DAILY LIVING (ADL): BATHING_REQUIRES_ASSISTANCE: 0

## 2024-08-08 NOTE — PROGRESS NOTES
Verbal consent was acquired by the patient to use [employer] ambient listening note generation during this visit     HPI:  Yu Kitchen is a 79 y.o. here for Medicare Annual Wellness Visit       History of Present Illness  The patient is an 79-year-old female who is here today for an annual wellness visit.    She successfully completed a clock sign test, achieving 2 out of 3 words in all subjects. An endoscopy and colonoscopy are scheduled for her. Her last endoscopy, performed 5 to 10 years ago, revealed minor swelling or scratchiness, leading to dilation. Recently, she has noticed an increase in frequency of these symptoms. She takes Pepcid every morning and occasionally in the evening, and occasionally uses Tums. She discontinued Fosamax due to gastrointestinal side effects. A bone density scan was conducted a few days ago, which revealed osteopenia in her hips. She has not undergone back surgery. She maintains an active lifestyle, including walking. She does not take calcium supplements, but takes Tums daily. She consumes a smoothie for lunch five days a week, primarily fish and dark greens. She started working with a  a few weeks ago for balance. She has never been diagnosed with prediabetes. She takes vitamin D daily but has discontinued B12. She takes allergy medication and PreserVision. She contracted COVID-19 two months ago, during which she experienced fatigue but no fever. She took Paxlovid diligently and has since recovered. She experiences nervousness during doctor's visits. She has dense breast tissue detected on mammograms. She underwent cataract surgery a year ago and feels comfortable driving at night. She uses reading glasses. She recently saw an ophthalmologist who found thickening of the cornea and prescribed Melanie drops in each eye every morning. She has been using Melanie drops for two months and has noticed improvement. She experiences swelling when eating chicken and potatoes,  which causes pain. She avoids putting anything in her mouth until food goes down. She has had palpitations for nearly 20 years.    She takes atorvastatin 20 mg for cholesterol. She did not take atorvastatin for 5 days during her COVID-19 infection two weeks ago. She has not noticed any side effects from the medication.    She takes lisinopril for hypertension.    IMMUNIZATIONS  She is up to date on her vaccines.         Patient Active Problem List    Diagnosis Date Noted    Odynophagia 08/08/2024    Change in bowel habit 08/08/2024    Bloating symptom 08/08/2024    Primary osteoarthritis of first carpometacarpal joint of right hand 09/20/2023    Closed fracture of base of middle phalanx of finger 12/30/2022    Closed displaced fracture of right patella 12/30/2022    Fuchs' corneal dystrophy of both eyes 12/20/2021    Macular drusen, bilateral 12/20/2021    Other specified disorders of bone density and structure, unspecified site 05/01/2019    MVP (mitral valve prolapse) 08/06/2018    History of colonic polyps 09/29/2015    Cyst of left kidney 03/25/2015    Liver cyst 03/25/2015    Premature beats 10/15/2010    Absolute anemia 03/21/2010    Family history of colonic polyps 07/01/2009    Dermatitis due to solar radiation 05/15/2006    Essential hypertension, benign 06/16/2003    Esophageal reflux 06/16/2003    Hyperlipidemia 06/16/2003    Osteopenia 06/16/2003       Current Outpatient Medications   Medication Sig Dispense Refill    aspirin (ASA) 81 MG Chew Tab chewable tablet Chew 81 mg every day.      atorvastatin (LIPITOR) 40 MG Tab Take 1 Tablet by mouth every evening. 90 Tablet 1    tretinoin (RETIN-A) 0.1 % cream APPLY THIN LAYER TO FACE AFTER CLEANSING STARTING WITH 2 TO 3 TIMES WEEKLY THEN INCREASE TO NIGHTLY AS TOLERATED. USE SUNSCREEN IN THE MORNING      Cholecalciferol (D3 PO) Take 1 Capsule by mouth every day.      Multiple Vitamins-Minerals (PRESERVISION AREDS 2 PO) Take 1 Capsule by mouth every day.       diphenhydrAMINE HCl (ALLERGY MEDICATION PO) Take 0.5 Tablets by mouth every day. (OTC)      ibuprofen (MOTRIN) 200 MG Tab Take 200 mg by mouth every 6 hours as needed. Indications: Pain      lisinopril (PRINIVIL) 5 MG Tab Take 5 mg by mouth every evening.      Cyanocobalamin (B-12 PO) Take 1 Tablet by mouth every day.       No current facility-administered medications for this visit.          Current supplements as per medication list.     Allergies: Procaine    Current social contact/activities: friends/family     She  reports that she has never smoked. She has never used smokeless tobacco. She reports current alcohol use. She reports that she does not currently use drugs.  Counseling given: Not Answered      ROS:          Gait: Uses no assistive device  Ostomy: No  Other tubes: No  Amputations: No  Chronic oxygen use: No  Last eye exam: Up-to-date  Wears hearing aids: No   : Denies any unmanageable urinary leakage during the last 6 months       Screening:     Depression Screening  Little interest or pleasure in doing things?  0 - not at all  Feeling down, depressed , or hopeless? 0 - not at all  Patient Health Questionnaire Score: 0     If depressive symptoms identified deferred to follow up visit unless specifically addressed in assessment and plan.    Interpretation of PHQ-9 Total Score   Score Severity   1-4 No Depression   5-9 Mild Depression   10-14 Moderate Depression   15-19 Moderately Severe Depression   20-27 Severe Depression    Screening for Cognitive Impairment  Do you or any of your friends or family members have any concern about your memory? No  Three Minute Recall (Leader, Season, Table) 2/3    [place] clock face with all 12 numbers and set the hands to show 10 minutes after 11.  Yes    Cognitive concerns identified deferred for follow up unless specifically addressed in assessment and plan.    Fall Risk Assessment  Has the patient had two or more falls in the last year or any fall with injury in  the last year?  No    Safety Assessment  Do you always wear your seatbelt?  Yes  Any changes to home needed to function safely? No  Difficulty hearing.  Yes  Patient counseled about all safety risks that were identified.    Functional Assessment ADLs  Are there any barriers preventing you from cooking for yourself or meeting nutritional needs?  No.    Are there any barriers preventing you from driving safely or obtaining transportation?  No.    Are there any barriers preventing you from using a telephone or calling for help?  No    Are there any barriers preventing you from shopping?  No.    Are there any barriers preventing you from taking care of your own finances?  No    Are there any barriers preventing you from managing your medications?  No    Are there any barriers preventing you from showering, bathing or dressing yourself? No    Are there any barriers preventing you from doing housework or laundry? No  Are there any barriers preventing you from using the toilet?No  Are you currently engaging in any exercise or physical activity?  Yes.      Self-Assessment of Health  What is your perception of your health? Good    Do you sleep more than six hours a night? Yes    In the past 7 days, how much did pain keep you from doing your normal work? None    Do you spend quality time with family or friends (virtually or in person)? Yes    Do you usually eat a heart healthy diet that constists of a variety of fruits, vegetables, whole grains and fiber? Yes    Do you eat foods high in fat and/or Fast Food more than three times per week? No    How concerned are you that your medical conditions are not being well managed? Not at all    Are you worried that in the next 2 months, you may not have stable housing that you own, rent, or stay in as part of a household? No      Advance Care Planning  Do you have an Advance Directive, Living Will, Durable Power of , or POLST? No                 Health Maintenance Summary             Overdue - Polio Vaccine (Inactivated Polio) (2 of 3 - Adult catch-up series) Overdue since 6/19/2000 05/22/2000  Imm Admin: IPV              Influenza Vaccine (1) Next due on 9/1/2024 09/27/2023  Imm Admin: Influenza Vaccine Adult HD    09/26/2022  Imm Admin: Influenza Vaccine, Quadrivalent, Adjuvanted (Pf)    09/02/2021  Imm Admin: Influenza Vaccine Adult HD    09/02/2020  Imm Admin: Influenza (IM) Preservative Free - HISTORICAL DATA    09/15/2019  Imm Admin: Influenza Vaccine Adult HD    Only the first 5 history entries have been loaded, but more history exists.              Annual Wellness Visit (Yearly) Next due on 8/8/2025 08/08/2024  Level of Service: [facility] ANNUAL WELLNESS VISIT-INCLUDES PPPS SUBSEQUE*    08/08/2024  Visit Dx: Medicare annual wellness visit, subsequent    08/07/2023 07/13/2022 05/03/2021      Only the first 5 history entries have been loaded, but more history exists.              Bone Density Scan (Every 5 Years) Next due on 7/29/2029 07/29/2024  DS-BONE DENSITY STUDY (DEXA)    07/14/2022  DS-BONE DENSITY STUDY (DEXA)    06/29/2021  DS-BONE DENSITY STUDY (DEXA)    02/26/2020  DS-BONE DENSITY STUDY (DEXA)    11/14/2018  DS-BONE DENSITY STUDY (DEXA)    Only the first 5 history entries have been loaded, but more history exists.              IMM DTaP/Tdap/Td Vaccine (4 - Td or Tdap) Next due on 5/3/2031      05/03/2021  Imm Admin: Tdap Vaccine    12/10/2015  Imm Admin: Tdap Vaccine    06/28/2010  Imm Admin: Tdap Vaccine    05/22/2000  Imm Admin: TD Vaccine    10/18/1999  Imm Admin: dT Vaccine - HISTORICAL DATA    Only the first 5 history entries have been loaded, but more history exists.              Hepatitis B Vaccine (Hep B) (Series Information) Completed      10/06/2003  Imm Admin: Hepatitis B Vaccine (Adol/Adult)    05/01/2003  Imm Admin: Hepatitis B Vaccine (Adol/Adult)    04/01/2003  Imm Admin: Hepatitis B Vaccine (Adol/Adult)              Pneumococcal  Vaccine: 65+ Years (Series Information) Completed      03/09/2015  Imm Admin: Pneumococcal Conjugate Vaccine (Prevnar/PCV-13)    08/08/2008  Imm Admin: Pneumococcal polysaccharide vaccine (PPSV-23)              Hepatitis A Vaccine (Hep A) (Series Information) Aged Out      04/19/2017  Imm Admin: Hepatitis A Vaccine, Adult    12/08/2000  Imm Admin: Hepatitis A Vaccine, Adult    05/22/2000  Imm Admin: Hepatitis A Vaccine, Adult              Zoster (Shingles) Vaccines (Series Information) Completed      06/29/2020  Imm Admin: Zoster Vaccine Recombinant (RZV) (SHINGRIX)    02/24/2020  Imm Admin: Zoster Vaccine Recombinant (RZV) (SHINGRIX)    09/17/2008  Imm Admin: Zoster Vaccine Live (ZVL) (Zostavax) - HISTORICAL DATA              COVID-19 Vaccine (Series Information) Completed      09/27/2023  Imm Admin: Comirnaty (Covid-19 Vaccine, Mrna, [id number] Formula)    09/26/2022  Imm Admin: MODERNA BIVALENT BOOSTER SARS-COV-2 VACCINE (6+)    04/04/2022  Imm Admin: MODERNA SARS-COV-2 VACCINE (12+)    03/01/2022  Imm Admin: MODERNA SARS-COV-2 VACCINE (12+)    10/01/2021  Imm Admin: MODERNA SARS-COV-2 VACCINE (12+)    Only the first 5 history entries have been loaded, but more history exists.              HPV Vaccines (Series Information) Aged Out      No completion history exists for this topic.              Meningococcal Immunization (Series Information) Aged Out      No completion history exists for this topic.              Discontinued - Mammogram  Scheduled for 9/12/2024        Frequency changed to Never automatically (Topic No Longer Applies)    09/11/2023  MA-SCREENING MAMMO BILAT W/TOMOSYNTHESIS W/CAD    07/14/2022  MA-SCREENING MAMMO BILAT W/TOMOSYNTHESIS W/CAD    05/03/2021  MA-SCREENING MAMMO BILAT W/TOMOSYNTHESIS W/CAD    03/04/2020  MA-SCREENING MAMMO BILAT W/TOMOSYNTHESIS W/CAD    Only the first 5 history entries have been loaded, but more history exists.              Discontinued - Hepatitis C Screening   "Discontinued        Frequency changed to Never automatically (Topic No Longer Applies)    05/04/2021  HEP C VIRUS ANTIBODY                    Patient Care Team:  Heaven Parker M.D. as PCP - General (Family Medicine)  Gail Soto R.N.        Social History     Tobacco Use    Smoking status: Never    Smokeless tobacco: Never   Substance Use Topics    Alcohol use: Yes     Comment: wine 1x/night    Drug use: Not Currently     Family History   Problem Relation Age of Onset    Cancer Mother         Breast    Hypertension Mother     Stroke Mother     Cancer Father 72        pancreatic    Cancer Sister         ovarian    No Known Problems Daughter      She  has a past medical history of Acute meniscal tear of knee (06/02/2015), Arthritis, Hypercholesteremia, Hypertension, Pathological fracture due to age-related osteoporosis (08/07/2023), and UTI (urinary tract infection).   Past Surgical History:   Procedure Laterality Date    TONSILLECTOMY         Exam:   /70   Pulse 79   Temp 36.1 °C (96.9 °F)   Resp 14   Ht 1.664 m (5' 5.51\")   Wt 61.7 kg (136 lb)   SpO2 94%  Body mass index is 22.28 kg/m².         Physical Exam  Constitutional:       General: She is not in acute distress.     Appearance: Normal appearance.   HENT:      Head: Normocephalic and atraumatic.      Nose: Nose normal.      Mouth/Throat:      Mouth: Mucous membranes are moist.   Eyes:      Conjunctiva/sclera: Conjunctivae normal.   Cardiovascular:      Rate and Rhythm: Normal rate and regular rhythm.   Pulmonary:      Effort: Pulmonary effort is normal.      Breath sounds: Normal breath sounds. No wheezing.   Abdominal:      General: Abdomen is flat.      Palpations: Abdomen is soft.   Musculoskeletal:      Cervical back: No rigidity.      Right lower leg: No edema.      Left lower leg: No edema.   Lymphadenopathy:      Cervical: No cervical adenopathy.   Skin:     Coloration: Skin is not jaundiced.      Findings: No erythema. "   Neurological:      General: No focal deficit present.      Mental Status: She is alert and oriented to person, place, and time. Mental status is at baseline.   Psychiatric:         Mood and Affect: Mood normal.         Behavior: Behavior normal.         Thought Content: Thought content normal.         Judgment: Judgment normal.       Hospital Outpatient Visit on 08/05/2024   Component Date Value Ref Range Status    Eject.Frac. MOD BP 08/05/2024 69.13   Final    Eject.Frac. MOD 4C 08/05/2024 55.34   Final    Eject.Frac. MOD 2C 08/05/2024 81.1   Final    Left Ventrical Ejection Fraction 08/05/2024 69   Final   Hospital Outpatient Visit on 08/01/2024   Component Date Value Ref Range Status    Lipoprotein (a) 08/01/2024 19  <=29 mg/dL Final    Comment: Performed By: [facility]  [address] [place] [id number]  : Sathya Silver MD, PhD  CLIA Number: [id number]     Hospital Outpatient Visit on 08/01/2024   Component Date Value Ref Range Status    WBC 08/01/2024 7.3  4.8 - 10.8 K/uL Final    RBC 08/01/2024 5.01  4.20 - 5.40 M/uL Final    Hemoglobin 08/01/2024 15.1  12.0 - 16.0 g/dL Final    Hematocrit 08/01/2024 45.7  37.0 - 47.0 % Final    MCV 08/01/2024 91.2  81.4 - 97.8 fL Final    MCH 08/01/2024 30.1  27.0 - 33.0 pg Final    MCHC 08/01/2024 33.0  32.2 - 35.5 g/dL Final    RDW 08/01/2024 42.7  35.9 - 50.0 fL Final    Platelet Count 08/01/2024 304  164 - 446 K/uL Final    MPV 08/01/2024 10.3  9.0 - 12.9 fL Final    Neutrophils-Polys 08/01/2024 61.30  44.00 - 72.00 % Final    Lymphocytes 08/01/2024 25.80  22.00 - 41.00 % Final    Monocytes 08/01/2024 10.70  0.00 - 13.40 % Final    Eosinophils 08/01/2024 1.40  0.00 - 6.90 % Final    Basophils 08/01/2024 0.50  0.00 - 1.80 % Final    Immature Granulocytes 08/01/2024 0.30  0.00 - 0.90 % Final    Nucleated RBC 08/01/2024 0.00  0.00 - 0.20 /100 WBC Final    Neutrophils (Absolute) 08/01/2024 4.48  1.82 - 7.42 K/uL Final    Includes  immature neutrophils, if present.    Lymphs (Absolute) 08/01/2024 1.88  1.00 - 4.80 K/uL Final    Monos (Absolute) 08/01/2024 0.78  0.00 - 0.85 K/uL Final    Eos (Absolute) 08/01/2024 0.10  0.00 - 0.51 K/uL Final    Baso (Absolute) 08/01/2024 0.04  0.00 - 0.12 K/uL Final    Immature Granulocytes (abs) 08/01/2024 0.02  0.00 - 0.11 K/uL Final    NRBC (Absolute) 08/01/2024 0.00  K/uL Final    TSH 08/01/2024 1.950  0.350 - 5.500 uIU/mL Final    Cholesterol,Tot 08/01/2024 214 (H)  100 - 199 mg/dL Final    Triglycerides 08/01/2024 155 (H)  0 - 149 mg/dL Final    HDL 08/01/2024 51  >=40 mg/dL Final    LDL 08/01/2024 132 (H)  <100 mg/dL Final    Sodium 08/01/2024 140  135 - 145 mmol/L Final    Potassium 08/01/2024 4.2  3.6 - 5.5 mmol/L Final    Chloride 08/01/2024 101  96 - 112 mmol/L Final    Co2 08/01/2024 26  20 - 33 mmol/L Final    Anion Gap 08/01/2024 13.0  7.0 - 16.0 Corrected    Corrected result; previously reported as 12.0 on 08/01/2024 at 20:52    Glucose 08/01/2024 81  65 - 99 mg/dL Final    Bun 08/01/2024 21  8 - 22 mg/dL Final    Creatinine 08/01/2024 0.79  0.50 - 1.40 mg/dL Final    Calcium 08/01/2024 9.4  8.5 - 10.5 mg/dL Final    Correct Calcium 08/01/2024 9.3  8.5 - 10.5 mg/dL Final    AST(SGOT) 08/01/2024 15  12 - 45 U/L Final    ALT(SGPT) 08/01/2024 13  2 - 50 U/L Final    Alkaline Phosphatase 08/01/2024 75  30 - 99 U/L Final    Total Bilirubin 08/01/2024 0.5  0.1 - 1.5 mg/dL Final    Albumin 08/01/2024 4.1  3.2 - 4.9 g/dL Final    Total Protein 08/01/2024 6.5  6.0 - 8.2 g/dL Final    Globulin 08/01/2024 2.4  1.9 - 3.5 g/dL Final    A-G Ratio 08/01/2024 1.7  g/dL Final    Vitamin B12 -True Cobalamin 08/01/2024 948 (H)  211 - 911 pg/mL Final    Glycohemoglobin 08/01/2024 5.9 (H)  4.0 - 5.6 % Final    Comment: Increased risk for diabetes:  5.7 -6.4%  Diabetes:  >6.4%  Glycemic control for adults with diabetes:  <7.0%    The above interpretations are per ADA guidelines.  Diagnosis  of diabetes mellitus on the  basis of elevated Hemoglobin A1c  should be confirmed by repeating the Hb A1c test.      Est Avg Glucose 08/01/2024 123  mg/dL Final    Comment: The eAG calculation is based on the A1c-Derived Daily Glucose  (ADAG) study.  See the ADA's website for additional information.      25-Hydroxy   Vitamin D 25 08/01/2024 50  30 - 100 ng/mL Final    Comment: Adult Ranges:   <20 ng/mL - Deficiency  20-29 ng/mL - Insufficiency   ng/mL - Sufficiency  Electrochemiluminescence binding assay performed using Roche cordell e  immunoassay analyzer.  The [employer] Vitamin D total II assay is intended for  the quantitative determination of total 25 hydroxyvitamin D in human serum  and plasma. This assay is to be used as an aid in the assessment of vitamin  D sufficiency in adults.      GFR (CKD-EPI) 08/01/2024 75  >60 mL/min/1.73 m 2 Final    Comment: Estimated Glomerular Filtration Rate is calculated using  race neutral CKD-EPI 2021 equation per NKF-ASN recommendations.     ]       Assessment and Plan. The following treatment and monitoring plan is recommended:     1. Medicare annual wellness visit, subsequent    2. Essential hypertension, benign    3. Hyperlipidemia, unspecified hyperlipidemia type  - Lipid Profile; Future    4. Dense breast tissue on mammogram, unspecified type  - US-SCREENING WHOLE BREAST BILATERAL (3D SCREENING); Future    5. Elevated hemoglobin A1c  - HEMOGLOBIN A1C; Future      Assessment & Plan  1. Annual wellness visit.  A decrease in bone density in the hip by 3.7 percent was noted, while the spine appeared normal.  Osteostrong was recommended.  Continue with vitamin D supplements and very active lifestyle  She is working with a  and working on balance, free weights  Continue with walking regularly    Blood counts and thyroid function were normal. B12 and vitamin D levels were satisfactory. Kidney function was normal.    Healthcare maintenance-. An influenza vaccine and COVID-vaccine advised in the  fall      2. HTN-Blood pressure was excellent.        3.  Hyperlipidemia -she is on atorvastatin 40 mg daily, however because she was on Paxlovid for a week she had stopped it prior to last panel   Repeat A cholesterol panel and A1c were ordered.       Healthcare maintenance-. An influenza vaccine was administered.         4.  Dense breast tissue -A breast ultrasound was ordered.         5. Osteopenia- if osteoporosis is seen on next bone density then, annual treatment with IV infusion was recommended.                  Services suggested: No services needed at this time  Health Care Screening: Age-appropriate preventive services recommended by USPTF and ACIP covered by Medicare were discussed today. Services ordered if indicated and agreed upon by the patient.  Referrals offered: Community-based lifestyle interventions to reduce health risks and promote self-management and wellness, fall prevention, nutrition, physical activity, tobacco-use cessation, weight loss, and mental health services as per orders if indicated.    Discussion today about general wellness and lifestyle habits:    Prevent falls and reduce trip hazards; Cautioned about securing or removing rugs.  Have a working fire alarm and carbon monoxide detector;   Engage in regular physical activity and social activities     Follow-up:  Labs every 6 months and routine annual wellness exam     [id number]

## 2024-09-04 ENCOUNTER — OFFICE VISIT (OUTPATIENT)
Dept: CARDIOLOGY | Facility: MEDICAL CENTER | Age: 79
End: 2024-09-04
Attending: INTERNAL MEDICINE
Payer: MEDICARE

## 2024-09-04 VITALS
WEIGHT: 132 LBS | DIASTOLIC BLOOD PRESSURE: 60 MMHG | SYSTOLIC BLOOD PRESSURE: 112 MMHG | HEIGHT: 65 IN | HEART RATE: 75 BPM | BODY MASS INDEX: 21.99 KG/M2 | RESPIRATION RATE: 14 BRPM | OXYGEN SATURATION: 93 %

## 2024-09-04 DIAGNOSIS — E78.1 HYPERTRIGLYCERIDEMIA: ICD-10-CM

## 2024-09-04 DIAGNOSIS — E78.00 PURE HYPERCHOLESTEROLEMIA: ICD-10-CM

## 2024-09-04 DIAGNOSIS — I25.10 CORONARY ARTERY DISEASE INVOLVING NATIVE CORONARY ARTERY OF NATIVE HEART WITHOUT ANGINA PECTORIS: ICD-10-CM

## 2024-09-04 DIAGNOSIS — R93.1 AGATSTON CAC SCORE, >400: ICD-10-CM

## 2024-09-04 PROCEDURE — 99212 OFFICE O/P EST SF 10 MIN: CPT | Performed by: INTERNAL MEDICINE

## 2024-09-04 PROCEDURE — G2211 COMPLEX E/M VISIT ADD ON: HCPCS | Performed by: INTERNAL MEDICINE

## 2024-09-04 PROCEDURE — 3078F DIAST BP <80 MM HG: CPT | Performed by: INTERNAL MEDICINE

## 2024-09-04 PROCEDURE — 99214 OFFICE O/P EST MOD 30 MIN: CPT | Performed by: INTERNAL MEDICINE

## 2024-09-04 PROCEDURE — 3074F SYST BP LT 130 MM HG: CPT | Performed by: INTERNAL MEDICINE

## 2024-09-04 RX ORDER — FENOFIBRATE 160 MG/1
160 TABLET ORAL DAILY
Qty: 100 TABLET | Refills: 4 | Status: SHIPPED | OUTPATIENT
Start: 2024-09-04

## 2024-09-04 RX ORDER — BEMPEDOIC ACID AND EZETIMIBE 180; 10 MG/1; MG/1
180 TABLET, FILM COATED ORAL DAILY
Qty: 90 TABLET | Refills: 4 | Status: SHIPPED | OUTPATIENT
Start: 2024-09-04

## 2024-09-04 RX ORDER — ATORVASTATIN CALCIUM 40 MG/1
40 TABLET, FILM COATED ORAL NIGHTLY
Qty: 100 TABLET | Refills: 4 | Status: SHIPPED | OUTPATIENT
Start: 2024-09-04

## 2024-09-04 RX ORDER — LISINOPRIL 5 MG/1
5 TABLET ORAL EVERY EVENING
Qty: 100 TABLET | Refills: 4 | Status: SHIPPED | OUTPATIENT
Start: 2024-09-04

## 2024-09-04 ASSESSMENT — ENCOUNTER SYMPTOMS
VOMITING: 0
HEADACHES: 0
DOUBLE VISION: 0
BLOOD IN STOOL: 0
LOSS OF CONSCIOUSNESS: 0
BRUISES/BLEEDS EASILY: 0
DEPRESSION: 0
HALLUCINATIONS: 0
CHILLS: 0
MYALGIAS: 0
NAUSEA: 0
SPEECH CHANGE: 0
COUGH: 0
DIZZINESS: 0
PND: 0
EYE DISCHARGE: 0
ABDOMINAL PAIN: 0
BLURRED VISION: 0
EYE PAIN: 0
WEIGHT LOSS: 0
SHORTNESS OF BREATH: 0
SENSORY CHANGE: 0
PALPITATIONS: 0
FEVER: 0
FALLS: 0
ORTHOPNEA: 0
CLAUDICATION: 0

## 2024-09-04 ASSESSMENT — FIBROSIS 4 INDEX: FIB4 SCORE: 1.11

## 2024-09-04 NOTE — PROGRESS NOTES
Chief Complaint   Patient presents with    Follow-Up     Dx: Elevated coronary artery calcium score      Hypertension    Hyperlipidemia       Subjective     Yu Kitchen is an 79 y.o. female who presents today for cardiac care due to elevated coronary Ca score.    I have personally interpreted EKG today with patient, there is no evidence of acute coronary syndrome, no evidence of prior infarct, normal NH and QT interval, no significant conduction disease. Sinus rhythm.    Yu Kitchen does not have any history of heart attack arrhythmias in the past. she never had cardiac catheterization or ablations procedure in the past. At this time, she denies having chest pain shortness of breath presyncopal syncopal episodes.    I personally interpreted the images of her stress test which did not show evidence of coronary ischemia.    She does drink ETOH daily.    I have independently interpreted and reviewed blood tests results with patient in clinic which shows LDL level of 132 up from 104, triglycerides level of 155 up from 75, GFR of 60, K of 4.2. Hgba1c of 5.9. Lpa of 19.    I have independently interpreted and reviewed echocardiogram's actual images with patient which showed normal left ventricular systolic function. No wall motion abnormality. No evidence of pulmonary hypertension. No significant valvular disease.        Past Medical History:   Diagnosis Date    Acute meniscal tear of knee 06/02/2015    Formatting of this note might be different from the original. Symptoms resolved w/ elliptical exercise, avoiding running    Arthritis     Hypercholesteremia     Hypertension     Pathological fracture due to age-related osteoporosis 08/07/2023    Formatting of this note might be different from the original. Per referral to [facility] [place] [facility] on 8/7/23    UTI (urinary tract infection)      Past Surgical History:   Procedure Laterality Date    TONSILLECTOMY       Family History   Problem Relation Age of Onset     Cancer Mother         Breast    Hypertension Mother     Stroke Mother     Cancer Father 72        pancreatic    Cancer Sister         ovarian    No Known Problems Daughter      Social History     Socioeconomic History    Marital status:      Spouse name: Not on file    Number of children: Not on file    Years of education: Not on file    Highest education level: Not on file   Occupational History    Not on file   Tobacco Use    Smoking status: Never    Smokeless tobacco: Never   Substance and Sexual Activity    Alcohol use: Yes     Comment: wine 1x/night    Drug use: Not Currently    Sexual activity: Not on file   Other Topics Concern    Not on file   Social History Narrative    lives with her     She has 1 daughter and 2 granddaughters    Moved here, prior care was at [place]     Social Determinants of Health     Financial Resource Strain: Not on file   Food Insecurity: Not on file   Transportation Needs: Not on file   Physical Activity: Not on file   Stress: Not on file   Social Connections: Not on file   Intimate Partner Violence: Not on file   Housing Stability: Not on file     Allergies   Allergen Reactions    Procaine      Other reaction(s): Tachycardia     Outpatient Encounter Medications as of 9/4/2024   Medication Sig Dispense Refill    atorvastatin (LIPITOR) 40 MG Tab Take 1 Tablet by mouth every evening. 100 Tablet 4    lisinopril (PRINIVIL) 5 MG Tab Take 1 Tablet by mouth every evening. 100 Tablet 4    Bempedoic Acid-Ezetimibe (NEXLIZET) 180-10 MG Tab Take 180 mg by mouth every day. 90 Tablet 4    fenofibrate (TRIGLIDE) 160 MG tablet Take 1 Tablet by mouth every day. 100 Tablet 4    aspirin (ASA) 81 MG Chew Tab chewable tablet Chew 81 mg every day.      tretinoin (RETIN-A) 0.1 % cream APPLY THIN LAYER TO FACE AFTER CLEANSING STARTING WITH 2 TO 3 TIMES WEEKLY THEN INCREASE TO NIGHTLY AS TOLERATED. USE SUNSCREEN IN THE MORNING      Cholecalciferol (D3 PO) Take 1 Capsule by mouth every day.   "    Multiple Vitamins-Minerals (PRESERVISION AREDS 2 PO) Take 1 Capsule by mouth every day.      diphenhydrAMINE HCl (ALLERGY MEDICATION PO) Take 0.5 Tablets by mouth every day. (OTC)      ibuprofen (MOTRIN) 200 MG Tab Take 200 mg by mouth every 6 hours as needed. Indications: Pain      [DISCONTINUED] atorvastatin (LIPITOR) 40 MG Tab Take 1 Tablet by mouth every evening. 90 Tablet 1    [DISCONTINUED] lisinopril (PRINIVIL) 5 MG Tab Take 5 mg by mouth every evening.      [DISCONTINUED] Cyanocobalamin (B-12 PO) Take 1 Tablet by mouth every day. (Patient not taking: Reported on 9/4/2024)       No facility-administered encounter medications on file as of 9/4/2024.     Review of Systems   Constitutional:  Negative for chills, fever, malaise/fatigue and weight loss.   HENT:  Negative for ear discharge, ear pain, hearing loss and nosebleeds.    Eyes:  Negative for blurred vision, double vision, pain and discharge.   Respiratory:  Negative for cough and shortness of breath.    Cardiovascular:  Negative for chest pain, palpitations, orthopnea, claudication, leg swelling and PND.   Gastrointestinal:  Negative for abdominal pain, blood in stool, melena, nausea and vomiting.   Genitourinary:  Negative for dysuria and hematuria.   Musculoskeletal:  Negative for falls, joint pain and myalgias.   Skin:  Negative for itching and rash.   Neurological:  Negative for dizziness, sensory change, speech change, loss of consciousness and headaches.   Endo/Heme/Allergies:  Negative for environmental allergies. Does not bruise/bleed easily.   Psychiatric/Behavioral:  Negative for depression, hallucinations and suicidal ideas.               Objective     /60 (BP Location: Left arm, Patient Position: Sitting, BP Cuff Size: Adult)   Pulse 75   Resp 14   Ht 1.651 m (5' 5\")   Wt 59.9 kg (132 lb)   SpO2 93%   BMI 21.97 kg/m²     Physical Exam  Vitals and nursing note reviewed.   Constitutional:       General: She is not in acute " distress.     Appearance: She is not diaphoretic.   HENT:      Head: Normocephalic and atraumatic.      Right Ear: External ear normal.      Left Ear: External ear normal.      Nose: No congestion or rhinorrhea.   Eyes:      General:         Right eye: No discharge.         Left eye: No discharge.   Neck:      Thyroid: No thyromegaly.      Vascular: No JVD.   Cardiovascular:      Rate and Rhythm: Normal rate and regular rhythm.      Pulses: Normal pulses.   Pulmonary:      Effort: No respiratory distress.   Abdominal:      General: There is no distension.      Tenderness: There is no abdominal tenderness.   Musculoskeletal:         General: No swelling or tenderness.      Right lower leg: No edema.      Left lower leg: No edema.   Skin:     General: Skin is warm and dry.   Neurological:      Mental Status: She is alert and oriented to person, place, and time.      Cranial Nerves: No cranial nerve deficit.   Psychiatric:         Behavior: Behavior normal.                Assessment & Plan     1. Agatston CAC score, >400  atorvastatin (LIPITOR) 40 MG Tab    lisinopril (PRINIVIL) 5 MG Tab      2. Coronary artery disease involving native coronary artery of native heart without angina pectoris  atorvastatin (LIPITOR) 40 MG Tab    lisinopril (PRINIVIL) 5 MG Tab      3. Pure hypercholesterolemia  atorvastatin (LIPITOR) 40 MG Tab    lisinopril (PRINIVIL) 5 MG Tab    Bempedoic Acid-Ezetimibe (NEXLIZET) 180-10 MG Tab    Basic Metabolic Panel    LIPID PANEL      4. Hypertriglyceridemia  Bempedoic Acid-Ezetimibe (NEXLIZET) 180-10 MG Tab    fenofibrate (TRIGLIDE) 160 MG tablet    Basic Metabolic Panel    LIPID PANEL            Medical Decision Making: Today's Assessment/Status/Plan:   Blood pressure is well controlled. Continue Lisinopril 5 mg daily.  At this time patient is clinically stable in terms of her cardiac standpoint.  I will order transthoracic echocardiogram to assess for structural abnormalities.  Continue  Atorvastatin 40 mg daily.  Will add Nexlizet for better LDL control.  Will start Fenofibrate 160 mg daily for better triglyceride control.    This visit encounter signifies the visit complexity inherent to evaluation and management associated with medical care services that serve as the continuing focal point for all needed health care services and/or with medical care services that are part of ongoing care related to this patient's single, serious condition, complex cardiac condition.    Marc Escalona M.D.

## 2024-09-09 ENCOUNTER — APPOINTMENT (OUTPATIENT)
Dept: INTERNAL MEDICINE | Facility: IMAGING CENTER | Age: 79
End: 2024-09-09
Payer: MEDICARE

## 2024-09-12 ENCOUNTER — APPOINTMENT (OUTPATIENT)
Dept: RADIOLOGY | Facility: MEDICAL CENTER | Age: 79
End: 2024-09-12
Attending: FAMILY MEDICINE
Payer: MEDICARE

## 2024-09-13 ENCOUNTER — TELEPHONE (OUTPATIENT)
Dept: CARDIOLOGY | Facility: MEDICAL CENTER | Age: 79
End: 2024-09-13
Payer: MEDICARE

## 2024-09-13 NOTE — TELEPHONE ENCOUNTER
Received [employer] request via [employer]  for NEXLIZET 180-10MG TAB. (Quantity:90, Day Supply:90)     Insurance: OPTUM  Member ID:  [id number]  BIN: [id number]  PCN: [id number]  Group: [id number]     Ran Test claim via [employer] & medication Rejects stating prior authorization is required.

## 2024-09-13 NOTE — TELEPHONE ENCOUNTER
Prior Authorization for NEXLIZET 180-10MG TAB (Quantity: 90, Days: 90) has been submitted via Cover My Meds: Key ([id number])    Insurance: OPTUM    Will follow up in 24-48 business hours.

## 2024-09-17 ENCOUNTER — OFFICE VISIT (OUTPATIENT)
Dept: INTERNAL MEDICINE | Facility: IMAGING CENTER | Age: 79
End: 2024-09-17
Payer: MEDICARE

## 2024-09-17 VITALS
RESPIRATION RATE: 14 BRPM | TEMPERATURE: 97.9 F | SYSTOLIC BLOOD PRESSURE: 124 MMHG | WEIGHT: 134 LBS | BODY MASS INDEX: 22.3 KG/M2 | HEART RATE: 87 BPM | OXYGEN SATURATION: 97 % | DIASTOLIC BLOOD PRESSURE: 72 MMHG

## 2024-09-17 DIAGNOSIS — K21.00 GASTROESOPHAGEAL REFLUX DISEASE WITH ESOPHAGITIS WITHOUT HEMORRHAGE: ICD-10-CM

## 2024-09-17 DIAGNOSIS — S92.302A CLOSED NONDISPLACED FRACTURE OF METATARSAL BONE OF LEFT FOOT, UNSPECIFIED METATARSAL, INITIAL ENCOUNTER: ICD-10-CM

## 2024-09-17 DIAGNOSIS — E78.5 HYPERLIPIDEMIA, UNSPECIFIED HYPERLIPIDEMIA TYPE: Chronic | ICD-10-CM

## 2024-09-17 DIAGNOSIS — I10 ESSENTIAL HYPERTENSION, BENIGN: Chronic | ICD-10-CM

## 2024-09-17 DIAGNOSIS — S82.892A CLOSED AVULSION FRACTURE OF LEFT ANKLE, INITIAL ENCOUNTER: ICD-10-CM

## 2024-09-17 PROCEDURE — 99215 OFFICE O/P EST HI 40 MIN: CPT | Performed by: FAMILY MEDICINE

## 2024-09-17 PROCEDURE — 3078F DIAST BP <80 MM HG: CPT | Performed by: FAMILY MEDICINE

## 2024-09-17 PROCEDURE — 3074F SYST BP LT 130 MM HG: CPT | Performed by: FAMILY MEDICINE

## 2024-09-17 ASSESSMENT — FIBROSIS 4 INDEX: FIB4 SCORE: 1.11

## 2024-09-17 NOTE — PROGRESS NOTES
Verbal consent was acquired by the patient to use [employer] ambient listening note generation during this visit     Patient is a 79 y.o.female.established patient      History of Present Illness  The patient is an 79-year-old female who presents for evaluation of multiple medical concerns.    She experiences heartburn around 2:00 PM, which she manages with Tums. She was previously taking Pepcid in the morning and occasionally at dinner, but has since stopped. Despite experiencing heartburn almost daily, particularly before dinner, she does not take a second dose of Pepcid.   She is monitoring her diet to identify potential triggers for her heartburn. She also takes aspirin daily for CAD   She has been prescribed pantoprazole by Dr. Sullivan, and fenofibrate and Nexlizet by Dr. Garcia, but has not yet started these medications. She wanted to go over them w/ me first.    She was previously on atorvastatin 20 mg, but after  Cor CT we  increased the dosage to 40 mg. She has been on this increased dosage for several months.   During a recent visit, Dr. Escalona did   prescribe  2 new medications-nexlizet ( which she hasn't picked up yet due to pharmacy/insurance issues)  and fenofibrate, he also ordered a lipid panel and metabolic profile. She was advised to return in three months.        She recently twisted her left foot while walking, resulting in a fracture. She sought orthopedic care in [place], where she was advised to return in five to six weeks for a follow-up x-ray.    Her ankle remains swollen.    She had an endoscopy and colonoscopy recently. She has osteopenia.            /72 (BP Location: Left arm, Patient Position: Sitting, BP Cuff Size: Adult)   Pulse 87   Temp 36.6 °C (97.9 °F) (Temporal)   Resp 14   Wt 60.8 kg (134 lb)   SpO2 97% , Body mass index is 22.3 kg/m².    Physical Exam      Physical Exam  Constitutional:       General: She is not in acute distress.     Appearance: Normal appearance. She is  normal weight. She is not ill-appearing, toxic-appearing or diaphoretic.   HENT:      Head: Normocephalic and atraumatic.      Ears:      Comments:       Nose: Nose normal.   Eyes:      Conjunctiva/sclera: Conjunctivae normal.   Cardiovascular:      Rate and Rhythm: Normal rate.   Pulmonary:      Effort: Pulmonary effort is normal.   Musculoskeletal:      Cervical back: Neck supple.   Neurological:      General: No focal deficit present.      Mental Status: She is alert.   Psychiatric:         Mood and Affect: Mood normal.         Behavior: Behavior normal.         Thought Content: Thought content normal.         Judgment: Judgment normal.             Results            Assessment & Plan  1. Gastroesophageal Reflux Disease (GERD).  Pantoprazole 20 mg is recommended to be taken 20 minutes before breakfast daily as the baseline medication. Pepcid can be taken as needed for heartburn relief, especially around 2:00 PM or whenever symptoms occur. She is advised to monitor and avoid foods that trigger her symptoms, such as coffee and avocado.    2. Hyperlipidemia.  Continuation of atorvastatin 40 mg daily is advised to manage cholesterol levels. She is also taking fenofibrate and another medication to lower triglycerides, which are being coordinated with her pharmacy and insurance for coverage. She should continue taking aspirin as previously prescribed.  1. Gastroesophageal reflux disease with esophagitis without hemorrhage    2. Hyperlipidemia, unspecified hyperlipidemia type    3. Closed nondisplaced fracture of metatarsal bone of left foot, unspecified metatarsal, initial encounter  - Referral to Orthopedics    4. Closed avulsion fracture of left ankle, initial encounter  - Referral to Orthopedics      She has received initial treatment by [facility]        Fractures.A referral to orthopedics has been made for follow-up care. She is instructed to wait for a message from the referral office and then schedule an  appointment with the orthopedic specialist at [facility] to follow up on her fracture. The timing of the follow-up is crucial to ensure proper healing.    5. Essential hypertension, benign--stable continue current regimen                 This note was created using voice recognition software (Dragon). The accuracy of the dictation is limited by the abilities of the software. I have reviewed the note prior to signing, however some errors in grammar and context are still possible. If you have any questions related to this note please do not hesitate to contact our office.

## 2024-09-18 ENCOUNTER — APPOINTMENT (OUTPATIENT)
Dept: RADIOLOGY | Facility: MEDICAL CENTER | Age: 79
End: 2024-09-18
Attending: FAMILY MEDICINE
Payer: MEDICARE

## 2024-09-18 DIAGNOSIS — Z80.3 FAMILY HISTORY OF BREAST CANCER IN MOTHER: ICD-10-CM

## 2024-09-18 DIAGNOSIS — Z12.31 ENCOUNTER FOR SCREENING MAMMOGRAM FOR BREAST CANCER: ICD-10-CM

## 2024-09-18 PROCEDURE — 77067 SCR MAMMO BI INCL CAD: CPT

## 2024-09-19 NOTE — TELEPHONE ENCOUNTER
Prior Authorization for NEXLIZET 180-10MG TAB  has been denied for a quantity of 90 , day supply 90    Prior authorization was denied per the following: NOT TRYING HIGH TOLERANCE STATIN, CHART NOTES WERE FAXED AND MISSED    Prior Authorization denial reference number: [id number]  Insurance: OPTUM      Next Steps:Proceed with appeal process. Generate proposed appeal for provider approval & signature.

## 2024-10-02 ENCOUNTER — TELEPHONE (OUTPATIENT)
Dept: CARDIOLOGY | Facility: MEDICAL CENTER | Age: 79
End: 2024-10-02
Payer: MEDICARE

## 2024-10-02 ENCOUNTER — HOSPITAL ENCOUNTER (OUTPATIENT)
Dept: RADIOLOGY | Facility: MEDICAL CENTER | Age: 79
End: 2024-10-02
Attending: FAMILY MEDICINE
Payer: MEDICARE

## 2024-10-02 DIAGNOSIS — R92.30 DENSE BREAST TISSUE ON MAMMOGRAM, UNSPECIFIED TYPE: ICD-10-CM

## 2024-10-02 DIAGNOSIS — E78.1 HYPERTRIGLYCERIDEMIA: ICD-10-CM

## 2024-10-02 PROCEDURE — 76641 ULTRASOUND BREAST COMPLETE: CPT

## 2024-10-04 ENCOUNTER — TELEPHONE (OUTPATIENT)
Dept: CARDIOLOGY | Facility: MEDICAL CENTER | Age: 79
End: 2024-10-04
Payer: MEDICARE

## 2024-10-04 DIAGNOSIS — E78.5 HYPERLIPIDEMIA, UNSPECIFIED HYPERLIPIDEMIA TYPE: Chronic | ICD-10-CM

## 2024-10-13 RX ORDER — ATOVAQUONE AND PROGUANIL HYDROCHLORIDE 250; 100 MG/1; MG/1
1 TABLET, FILM COATED ORAL DAILY
Qty: 28 TABLET | Refills: 0 | Status: SHIPPED | OUTPATIENT
Start: 2024-10-13

## 2024-11-05 ENCOUNTER — TELEPHONE (OUTPATIENT)
Dept: INTERNAL MEDICINE | Facility: IMAGING CENTER | Age: 79
End: 2024-11-05
Payer: MEDICARE

## 2024-11-05 NOTE — TELEPHONE ENCOUNTER
Yu just got back from [facility] about 2 days ago. She has been struggling with diarrhea since she left. She ate a seafood platter the night before they left and she thinks this caused the diarrhea. She has not taken any Imodium. Imodium was recommended for the diarrhea and to slowly introduce easily digestible foods such as the BRAT (bananas, rice, applesauce, and toast) and broth until she can tolerate other foods and the diarrhea is gone. If it continues to happen after 7 days then she was instructed to make an appointment with Dr. Parker.     They also have had a cough since getting back and OTC cough medications such as robitussin, delsym, and mucinex were recommended. It was also advised that this was likely a virus and would take some time to get rid of the cough.

## 2024-11-06 ENCOUNTER — NON-PROVIDER VISIT (OUTPATIENT)
Dept: INTERNAL MEDICINE | Facility: IMAGING CENTER | Age: 79
End: 2024-11-06
Payer: MEDICARE

## 2024-11-06 NOTE — PROGRESS NOTES
Yu is having diarrhea from traveler's diarrhea. Her  is concerned because she is not eating much. She did say that some components of the BRAT diet she was able to get down. Explained that as long as she is getting plenty of fluids right now she can slowly introduce food when she feels she can tolerate it.     Reiterated yesterday's discussion about traveler's diarrhea including taking an antidiarrheal per instructions on box, drinking lots of fluids, and eating BRAT diet and broth until the diarrhea starts getting better. Also reiterated that it takes time for the diarrhea to get through the system and that antibiotics are not always used right away. An appointment was made with Dr. Parker tomorrow.

## 2024-11-07 ENCOUNTER — HOSPITAL ENCOUNTER (OUTPATIENT)
Facility: MEDICAL CENTER | Age: 79
End: 2024-11-07
Attending: FAMILY MEDICINE
Payer: MEDICARE

## 2024-11-07 ENCOUNTER — OFFICE VISIT (OUTPATIENT)
Dept: INTERNAL MEDICINE | Facility: IMAGING CENTER | Age: 79
End: 2024-11-07
Payer: MEDICARE

## 2024-11-07 VITALS
HEIGHT: 66 IN | OXYGEN SATURATION: 94 % | HEART RATE: 88 BPM | DIASTOLIC BLOOD PRESSURE: 68 MMHG | TEMPERATURE: 98 F | SYSTOLIC BLOOD PRESSURE: 110 MMHG | RESPIRATION RATE: 14 BRPM | BODY MASS INDEX: 20.57 KG/M2 | WEIGHT: 128 LBS

## 2024-11-07 DIAGNOSIS — J22 LRTI (LOWER RESPIRATORY TRACT INFECTION): ICD-10-CM

## 2024-11-07 DIAGNOSIS — R19.7 DIARRHEA, UNSPECIFIED TYPE: Primary | ICD-10-CM

## 2024-11-07 DIAGNOSIS — R19.7 DIARRHEA, UNSPECIFIED TYPE: ICD-10-CM

## 2024-11-07 LAB
C DIFF DNA SPEC QL NAA+PROBE: NEGATIVE
C DIFF TOX GENS STL QL NAA+PROBE: NEGATIVE
C PARVUM AG STL QL IA.RAPID: NEGATIVE
G LAMBLIA AG STL QL IA.RAPID: NEGATIVE

## 2024-11-07 PROCEDURE — 3078F DIAST BP <80 MM HG: CPT | Performed by: FAMILY MEDICINE

## 2024-11-07 PROCEDURE — 87899 AGENT NOS ASSAY W/OPTIC: CPT

## 2024-11-07 PROCEDURE — 82274 ASSAY TEST FOR BLOOD FECAL: CPT

## 2024-11-07 PROCEDURE — 87045 FECES CULTURE AEROBIC BACT: CPT

## 2024-11-07 PROCEDURE — 87077 CULTURE AEROBIC IDENTIFY: CPT

## 2024-11-07 PROCEDURE — 87329 GIARDIA AG IA: CPT

## 2024-11-07 PROCEDURE — 87493 C DIFF AMPLIFIED PROBE: CPT

## 2024-11-07 PROCEDURE — 99214 OFFICE O/P EST MOD 30 MIN: CPT | Performed by: FAMILY MEDICINE

## 2024-11-07 PROCEDURE — 87328 CRYPTOSPORIDIUM AG IA: CPT

## 2024-11-07 PROCEDURE — 87046 STOOL CULTR AEROBIC BACT EA: CPT

## 2024-11-07 PROCEDURE — 3074F SYST BP LT 130 MM HG: CPT | Performed by: FAMILY MEDICINE

## 2024-11-07 RX ORDER — AZITHROMYCIN 250 MG/1
TABLET, FILM COATED ORAL
Qty: 6 TABLET | Refills: 0 | Status: SHIPPED | OUTPATIENT
Start: 2024-11-07

## 2024-11-07 ASSESSMENT — FIBROSIS 4 INDEX: FIB4 SCORE: 1.11

## 2024-11-07 NOTE — PROGRESS NOTES
"Verbal consent was acquired by the patient to use [employer] ambient listening note generation during this visit     Patient is a 79 y.o.female.established patient      History of Present Illness  The patient presents for evaluation of diarrhea. She is accompanied by .    She has been experiencing diarrhea for the past 4 to 5 days, which began after consuming a seafood platter during a trip to [facility]. Despite the diarrhea, she has been able to drink fluids and consume small amounts of food, such as half a banana. She has lost weight, dropping from her usual 134 pounds to 128 pounds. She has been taking Imodium for the past 2 days and has been following a BRAT diet. She has not noticed any blood in her stool.    Additionally, she has a cough, which started around the same time as the diarrhea. She has been feeling lightheaded but reports no dizziness. She reports no burning sensation during urination and has not been urinating frequently. She has been trying to stay hydrated by drinking 8 to 10 glasses of water daily.            /68   Pulse 88   Temp 36.7 °C (98 °F)   Resp 14   Ht 1.664 m (5' 5.51\")   Wt 58.1 kg (128 lb)   SpO2 94% , Body mass index is 20.97 kg/m².    Physical Exam      Physical Exam  Constitutional:       General: She is not in acute distress.     Appearance: Normal appearance. She is normal weight. She is not ill-appearing, toxic-appearing or diaphoretic.   HENT:      Head: Normocephalic and atraumatic.      Ears:      Comments:       Nose: Nose normal.   Eyes:      Conjunctiva/sclera: Conjunctivae normal.   Cardiovascular:      Rate and Rhythm: Normal rate and regular rhythm.   Pulmonary:      Effort: Pulmonary effort is normal. No respiratory distress.      Breath sounds: Normal breath sounds. No stridor. No wheezing, rhonchi or rales.   Abdominal:      General: Abdomen is flat.      Palpations: Abdomen is soft. There is no mass.      Tenderness: There is no abdominal " tenderness. There is no guarding or rebound.   Musculoskeletal:      Cervical back: Neck supple.   Neurological:      General: No focal deficit present.      Mental Status: She is alert.   Psychiatric:         Mood and Affect: Mood normal.         Behavior: Behavior normal.         Thought Content: Thought content normal.         Judgment: Judgment normal.             Results            Assessment & Plan  1. Diarrhea, unspecified type  OCCULT BLOOD FECES IMMUNOASSAY    C DIFFICILE TOXINS A+B, EIA    Complete O&P    CULTURE STOOL      2. LRTI (lower respiratory tract infection)              Discussion:    1. Diarrhea.  The patient has been experiencing diarrhea for the past 4-5 days, which started after consuming seafood. She has been advised to avoid Pepto-Bismol and instead consume French fries, rice, and Gatorade in small, frequent amounts. Stool samples will be collected and taken to the lab for further analysis to check for bacteria or parasites. A prescription for Zithromax has been provided, to be taken for a duration of 5 days. If the results indicate a need to change the antibiotic, she will be contacted. If symptoms improve, no additional medication will be added.    2. Cough.  The patient has a cough that started around the same time as the diarrhea. Zithromax has been prescribed, which may help with both the chest symptoms and any potential infection from food. If the cough improves with the current treatment, no further action will be necessary.                   This note was created using voice recognition software (Dragon). The accuracy of the dictation is limited by the abilities of the software. I have reviewed the note prior to signing, however some errors in grammar and context are still possible. If you have any questions related to this note please do not hesitate to contact our office.

## 2024-11-08 LAB
E COLI SXT1+2 STL IA: NORMAL
SIGNIFICANT IND 70042: NORMAL
SITE SITE: NORMAL
SOURCE SOURCE: NORMAL

## 2024-11-10 LAB
BACTERIA STL CULT: ABNORMAL
BACTERIA STL CULT: ABNORMAL
E COLI SXT1+2 STL IA: ABNORMAL
SIGNIFICANT IND 70042: ABNORMAL
SITE SITE: ABNORMAL
SOURCE SOURCE: ABNORMAL

## 2024-11-11 ENCOUNTER — NON-PROVIDER VISIT (OUTPATIENT)
Dept: VASCULAR LAB | Facility: MEDICAL CENTER | Age: 79
End: 2024-11-11
Attending: INTERNAL MEDICINE
Payer: MEDICARE

## 2024-11-11 DIAGNOSIS — E78.5 HYPERLIPIDEMIA, UNSPECIFIED HYPERLIPIDEMIA TYPE: Chronic | ICD-10-CM

## 2024-11-11 PROCEDURE — 99213 OFFICE O/P EST LOW 20 MIN: CPT

## 2024-11-11 NOTE — PROGRESS NOTES
[facility]  Date of Service: 11/11/24    Yu Kitchen presents for management of dyslipidemia    Referral Source:  To   Date First Seen in Clinic 11/11/2024    PERTINENT HLD PMHX  Age at Initial Diagnosis of Dyslipidemia: ~39 y/o       Baseline Lipids Prior to Treatment: unsure   Oldest labs available:    Latest Reference Range & Units 01/11/24 08:05   Cholesterol,Tot 100 - 199 mg/dL 198   Triglycerides 0 - 149 mg/dL 75   HDL >=40 mg/dL 79   LDL <100 mg/dL 104 (H)   (H): Data is abnormally high    History of ASCVD: Documented clinical evidence of ASCVD: and CAD and/or CAC >300    Previously Attempted Interventions for Lipids - including outcome  Statin: unknown statin      Outcome: other unknown   Non-Statin: none  Outcome: not applicable    Secondary causes/contributors (report to medical director if present):   Endocrine/Hypothyroidism:  none reported   Liver disease: none reported   Renal disease/nephrotic syndrome:  none reported  Dietary-induced (ketogenic, lean mass hyper-responder)? no  Medications: None    CURRENT MED MGMT  Current Lipid Lowering Meds:   Statin: atorvastatin 40mg   Non-Statin: none  Supplements: None  Current adverse drug reactions/side effects? no  Is Adherence an Issue? no    Any Family History Cardiovascular Disease? yes  Relationship: mother, stroke ; Age of Onset: 70s      There were no vitals filed for this visit.   BMI Readings from Last 1 Encounters:   11/07/24 20.97 kg/m²      Wt Readings from Last 3 Encounters:   11/07/24 58.1 kg (128 lb)   09/17/24 60.8 kg (134 lb)   09/04/24 59.9 kg (132 lb)     BP Readings from Last 2 Encounters:   11/07/24 110/68   09/17/24 124/72       DATA REVIEW:  Most Recent Lipid Panel:   Lab Results   Component Value Date/Time    CHOLSTRLTOT 214 (H) 08/01/2024 08:05 AM     (H) 08/01/2024 08:05 AM    HDL 51 08/01/2024 08:05 AM    TRIGLYCERIDE 155 (H) 08/01/2024 08:05 AM     Lab Results   Component Value Date/Time     (H)  "08/01/2024 08:05 AM     (H) 01/11/2024 08:05 AM      Lab Results   Component Value Date/Time    LIPOPROTA 19 08/01/2024 08:05 AM      No results found for: \"APOB\"   No results found for: \"CRPHIGHSEN\"    Other Pertinent Blood Work:   Lab Results   Component Value Date    SODIUM 140 08/01/2024    POTASSIUM 4.2 08/01/2024    CHLORIDE 101 08/01/2024    CO2 26 08/01/2024    ANION 13.0 08/01/2024    GLUCOSE 81 08/01/2024    BUN 21 08/01/2024    CREATININE 0.79 08/01/2024    CALCIUM 9.4 08/01/2024    ASTSGOT 15 08/01/2024    ALTSGPT 13 08/01/2024    ALKPHOSPHAT 75 08/01/2024    TBILIRUBIN 0.5 08/01/2024    ALBUMIN 4.1 08/01/2024    AGRATIO 1.7 08/01/2024    TSHULTRASEN 1.950 08/01/2024       VASCULAR IMAGING:  CAC Score (if available): 747    ASSESSMENT AND PLAN    Patient Type, check all that apply: Secondary Prevention    Major ASCVD events: None      Evidence of genetic dyslipidemia (Familial Hyperlipidemia): No   (Delete - If yes or possible provide justification and recommend cascade screening - delete)    ASCVD risk calculations (if applicable)  The ASCVD Risk score (Boo BROOKS, et al., 2019) failed to calculate., N/A    ACC/AHA Indication for Statin Therapy:  Established ASCVD: Indication for High intensity statin     Other Significant Risk Markers:  High-risk conditions: >64yr old  and Hypertension   Risk-enhancers: None  Lipoprotein(a): Favorable (<30 mg/dl or <75 nmol/L  Most recent CAC (if available): Total Calcium Score: 746.0    Percentile: Calcium score is above the 90th percentile for the patient's age and sex.     Lipid Goals:  Primary: LDL-C <70 mg/dl  Secondary apoB <70 mg/dl  At goals? no    LIFESTYLE INTERVENTIONS  TOBACCO: none  continued complete avoidance of all tobacco products   EtOH: likes to have 1 vodka tonic at night -- cutting back to 1-2 times per week   Men: No more than two standard servings per day  Women: No more than one standard serving per day  PHYSICAL ACTIVITY:  highly active " lifestyle, regular swimming, golf, elliptical, walking, treadmill daily   NUTRITION: healthy lifestyle, emphasis on fish, vegetables, lean meats     LIPID-LOWERING MEDICATION MANAGEMENT:     Statin Therapy:   Continue Atorvastatin 40mg daily     PCSK9 Inhibitor strategy indications: ASCVD with suboptimal control of LDL-C despite maximally tolerated statin    Non-Statin Meds:   EZETIMIBE: Not currently indicated  NEXLETOL/NEXLIZET (avoid simva >20, prava >40): Not currently indicated  BAS: Not currently indicated    OMEGA-3 FAs: Not currently indicated  FIBRATE:  Not currently indicated  PCSK9 mAb: Not currently indicated  LEQVIO: Start Leqvio 284 mg subQ as scheduled (Day 1, 90, then Q6mon)    Patient's LDL remains well above goal at 132 as of 8/1/24 despite high intensity statin. It increased from her previous reading in January 2024. The patient has an exceptionally healthy lifestyle with daily exercise and a mediterranean-focused diet mainly with lean proteins and vegetables. Her CAC score is in the 90th percentile for her age, therefore we want to decrease LDL to at least 70.   We discussed Repatha, however, the patient's lifestyle involves a lot of travelling so she would be unable to appropriately store Repatha. She is unable to afford Nexlizet, but requires significantly more LDL lowering than ezetimibe alone can provide. Leqvio would provide the appropriate level of LDL lowering needed (~50%) without the storage and administration issues of a PCSK9i.     Recommended Supplements: None     Studies Ordered at Todays Visit: None   Blood Work To Be Obtained Prior to Next Visit: ApoB -- lipoprotein A previously obtained, lipid panel ordered by provider   Follow-Up: upon approval of Leqvio for initial injection     Ruthann Gill PharmD   Chart sent to [facility] for Leqvio initiation process.     CC:  Michael Bloch, MD Cecilia G O'Dowd, M.D.  ToMarc M.D.

## 2024-11-13 ENCOUNTER — APPOINTMENT (OUTPATIENT)
Dept: INTERNAL MEDICINE | Facility: IMAGING CENTER | Age: 79
End: 2024-11-13
Payer: MEDICARE

## 2024-11-13 ENCOUNTER — DOCUMENTATION (OUTPATIENT)
Dept: VASCULAR LAB | Facility: MEDICAL CENTER | Age: 79
End: 2024-11-13

## 2024-11-13 DIAGNOSIS — E78.5 HYPERLIPIDEMIA, UNSPECIFIED HYPERLIPIDEMIA TYPE: Chronic | ICD-10-CM

## 2024-11-13 PROBLEM — R93.1 ELEVATED CORONARY ARTERY CALCIUM SCORE: Status: ACTIVE | Noted: 2024-11-13

## 2024-11-13 NOTE — PROGRESS NOTES
Requirements for Leqvio coverage:  Has [facility] investigated cost: Submitted today  Does pt need a PA: TBSAMY  When does the PA : N/a  Does pt qualify for copay card: N/a  When does the copay card : N/a  Has oncology financial counseling determined cost: Referral placed today  What is the cost needed to obtain from pt prior to injection: GALLO Nichols, CoreyD, BCACP

## 2024-11-14 ENCOUNTER — HOSPITAL ENCOUNTER (OUTPATIENT)
Facility: MEDICAL CENTER | Age: 79
End: 2024-11-14
Attending: FAMILY MEDICINE
Payer: MEDICARE

## 2024-11-14 ENCOUNTER — TELEPHONE (OUTPATIENT)
Dept: VASCULAR LAB | Facility: MEDICAL CENTER | Age: 79
End: 2024-11-14

## 2024-11-14 ENCOUNTER — NON-PROVIDER VISIT (OUTPATIENT)
Dept: INTERNAL MEDICINE | Facility: IMAGING CENTER | Age: 79
End: 2024-11-14
Payer: MEDICARE

## 2024-11-14 ENCOUNTER — HOSPITAL ENCOUNTER (OUTPATIENT)
Facility: MEDICAL CENTER | Age: 79
End: 2024-11-14
Attending: INTERNAL MEDICINE
Payer: MEDICARE

## 2024-11-14 DIAGNOSIS — E78.1 HYPERTRIGLYCERIDEMIA: ICD-10-CM

## 2024-11-14 DIAGNOSIS — Z01.89 ENCOUNTER FOR ROUTINE LABORATORY TESTING: ICD-10-CM

## 2024-11-14 DIAGNOSIS — E78.00 PURE HYPERCHOLESTEROLEMIA: ICD-10-CM

## 2024-11-14 DIAGNOSIS — R73.09 ELEVATED HEMOGLOBIN A1C: ICD-10-CM

## 2024-11-14 DIAGNOSIS — E78.5 HYPERLIPIDEMIA, UNSPECIFIED HYPERLIPIDEMIA TYPE: Chronic | ICD-10-CM

## 2024-11-14 LAB
CHOLEST SERPL-MCNC: 145 MG/DL (ref 100–199)
EST. AVERAGE GLUCOSE BLD GHB EST-MCNC: 126 MG/DL
HBA1C MFR BLD: 6 % (ref 4–5.6)
HDLC SERPL-MCNC: 61 MG/DL
IMM ASSAY OCC BLD FITOB: NEGATIVE
LDLC SERPL CALC-MCNC: 72 MG/DL
TRIGL SERPL-MCNC: 58 MG/DL (ref 0–149)

## 2024-11-14 PROCEDURE — 80061 LIPID PANEL: CPT

## 2024-11-14 PROCEDURE — 83036 HEMOGLOBIN GLYCOSYLATED A1C: CPT

## 2024-11-14 PROCEDURE — 82172 ASSAY OF APOLIPOPROTEIN: CPT

## 2024-11-14 PROCEDURE — 99999 PR NO CHARGE: CPT

## 2024-11-14 PROCEDURE — 80048 BASIC METABOLIC PNL TOTAL CA: CPT

## 2024-11-14 NOTE — TELEPHONE ENCOUNTER
Called pt back - advised her that [employer] is calling regarding Leqvio enrollment. Pt will call them back today.    Pato Nichols, CoreyD, BCACP

## 2024-11-14 NOTE — TELEPHONE ENCOUNTER
Caller: Yu Kitchen    Topic/issue: Patient received two phone calls today - one from [facility] and one from [employer]. Patient is unsure if either of these calls have to do with a referral that was placed after her visit with pharmacotherapy on 11/11. Would like a callback to discuss.     Callback Number: [phone]    Thank you,   Tania BOLIVAR

## 2024-11-15 LAB
ANION GAP SERPL CALC-SCNC: 14 MMOL/L (ref 7–16)
BUN SERPL-MCNC: 22 MG/DL (ref 8–22)
CALCIUM SERPL-MCNC: 9 MG/DL (ref 8.5–10.5)
CHLORIDE SERPL-SCNC: 102 MMOL/L (ref 96–112)
CO2 SERPL-SCNC: 24 MMOL/L (ref 20–33)
CREAT SERPL-MCNC: 1.18 MG/DL (ref 0.5–1.4)
GFR SERPLBLD CREATININE-BSD FMLA CKD-EPI: 46 ML/MIN/1.73 M 2
GLUCOSE SERPL-MCNC: 88 MG/DL (ref 65–99)
POTASSIUM SERPL-SCNC: 4.3 MMOL/L (ref 3.6–5.5)
SODIUM SERPL-SCNC: 140 MMOL/L (ref 135–145)

## 2024-11-17 LAB — APO B100 SERPL-MCNC: 71 MG/DL (ref 60–117)

## 2024-11-18 NOTE — PROGRESS NOTES
ADDENDUM 24:    Requirements for Leqvio coverage:  Has [facility] investigated cost: Submitted today  Does pt need a PA: No  When does the PA : N/a  Does pt qualify for copay card: N/a  When does the copay card : N/a  Has oncology financial counseling determined cost: Referral placed  What is the cost needed to obtain from pt prior to injection: TBD    Messaged [facility] financial dept to inquire about copay for pt.     Pato Nichols, CoreyD, BCACP

## 2024-11-20 NOTE — PROGRESS NOTES
ADDENDUM 24:     Requirements for Leqvio coverage:  Has Leqvio Service center investigated cost: Submitted today  Does pt need a PA: No  When does the PA : N/a  Does pt qualify for copay card: N/a  When does the copay card : N/a  Has oncology financial counseling determined cost: Referral placed  What is the cost needed to obtain from pt prior to injection: $0     Called and notified pt of the above. Scheduled pt for initial dose of Leqvio on 24.    Pt requests documentation that Leqvio authorized per her ins. Will send [facility] statement of benefits.     Pato Nichols, PharmD, BCACP

## 2024-11-21 ENCOUNTER — TELEPHONE (OUTPATIENT)
Dept: VASCULAR LAB | Facility: MEDICAL CENTER | Age: 79
End: 2024-11-21

## 2024-11-21 ENCOUNTER — OFFICE VISIT (OUTPATIENT)
Dept: INTERNAL MEDICINE | Facility: IMAGING CENTER | Age: 79
End: 2024-11-21
Payer: MEDICARE

## 2024-11-21 ENCOUNTER — HOSPITAL ENCOUNTER (OUTPATIENT)
Facility: MEDICAL CENTER | Age: 79
End: 2024-11-21
Attending: FAMILY MEDICINE
Payer: MEDICARE

## 2024-11-21 VITALS
HEART RATE: 95 BPM | TEMPERATURE: 97.5 F | SYSTOLIC BLOOD PRESSURE: 126 MMHG | BODY MASS INDEX: 20.97 KG/M2 | DIASTOLIC BLOOD PRESSURE: 70 MMHG | OXYGEN SATURATION: 95 % | RESPIRATION RATE: 14 BRPM | HEIGHT: 66 IN

## 2024-11-21 DIAGNOSIS — I10 ESSENTIAL HYPERTENSION, BENIGN: ICD-10-CM

## 2024-11-21 DIAGNOSIS — Z79.899 MEDICATION MANAGEMENT: ICD-10-CM

## 2024-11-21 PROBLEM — K63.5 POLYP OF COLON: Status: ACTIVE | Noted: 2024-08-14

## 2024-11-21 PROBLEM — K22.2 ESOPHAGEAL OBSTRUCTION: Status: ACTIVE | Noted: 2024-08-14

## 2024-11-21 PROBLEM — K57.30 DIVERTICULOSIS OF LARGE INTESTINE WITHOUT PERFORATION OR ABSCESS WITHOUT BLEEDING: Status: ACTIVE | Noted: 2024-08-14

## 2024-11-21 LAB
ALBUMIN SERPL BCP-MCNC: 4 G/DL (ref 3.2–4.9)
ALBUMIN/GLOB SERPL: 1.7 G/DL
ALP SERPL-CCNC: 68 U/L (ref 30–99)
ALT SERPL-CCNC: 15 U/L (ref 2–50)
ANION GAP SERPL CALC-SCNC: 9 MMOL/L (ref 7–16)
AST SERPL-CCNC: 21 U/L (ref 12–45)
BILIRUB SERPL-MCNC: 0.6 MG/DL (ref 0.1–1.5)
BUN SERPL-MCNC: 17 MG/DL (ref 8–22)
CALCIUM ALBUM COR SERPL-MCNC: 9.1 MG/DL (ref 8.5–10.5)
CALCIUM SERPL-MCNC: 9.1 MG/DL (ref 8.5–10.5)
CHLORIDE SERPL-SCNC: 105 MMOL/L (ref 96–112)
CO2 SERPL-SCNC: 26 MMOL/L (ref 20–33)
CREAT SERPL-MCNC: 0.87 MG/DL (ref 0.5–1.4)
GFR SERPLBLD CREATININE-BSD FMLA CKD-EPI: 67 ML/MIN/1.73 M 2
GLOBULIN SER CALC-MCNC: 2.4 G/DL (ref 1.9–3.5)
GLUCOSE SERPL-MCNC: 95 MG/DL (ref 65–99)
POTASSIUM SERPL-SCNC: 4 MMOL/L (ref 3.6–5.5)
PROT SERPL-MCNC: 6.4 G/DL (ref 6–8.2)
SODIUM SERPL-SCNC: 140 MMOL/L (ref 135–145)

## 2024-11-21 PROCEDURE — 3078F DIAST BP <80 MM HG: CPT | Performed by: FAMILY MEDICINE

## 2024-11-21 PROCEDURE — 80053 COMPREHEN METABOLIC PANEL: CPT

## 2024-11-21 PROCEDURE — 99214 OFFICE O/P EST MOD 30 MIN: CPT | Performed by: FAMILY MEDICINE

## 2024-11-21 PROCEDURE — 3074F SYST BP LT 130 MM HG: CPT | Performed by: FAMILY MEDICINE

## 2024-11-21 NOTE — TELEPHONE ENCOUNTER
Called pt back - confirmed she received [facility] w/ Leqvio benefit investigation. Reassured pt that Leqvio is $0 copay per [facility] benefit investigation and [facility] investigation.    Assured pt that Dr. Escalona referred her to the lipid clinic for her care. By default he defers management of lipids to our service/Dr. Bloch.    Clarified Leqvio dosing w/ pt per her request.    Answered all of her questions to her satisfaction.    Pato Nichols, PharmD, BCACP

## 2024-11-21 NOTE — TELEPHONE ENCOUNTER
Caller: Yu Kitchen    Topic/issue: Patient calling requesting to speak with someone in our pharmacotherapy department about the new medication Leqvio.     Patient states she did not receive anything showing that this would be covered by her insurance and she wants to make sure that it will be covered.    Patient also wants to make sure Dr. Escalona has been made aware and approves of this medication being prescribed for her and is requesting a call back.     Callback Number: [phone]    Thank you,  Nasima HAWKINS

## 2024-11-27 NOTE — PROGRESS NOTES
"Verbal consent was acquired by the patient to use [employer] ambient listening note generation during this visit     Patient is a 79 y.o.female.established patient     History of Present Illness  The patient is an 79-year-old female who presents for evaluation of multiple medical concerns.    She recently visited a lipid clinic where she was informed about the possibility of taking oral medication every six months. She received a message confirming that her insurance had approved this treatment. She has been maintaining a healthy diet and regular exercise routine, although she had to pause her treadmill workouts for a few months due to knee and ankle issues. These issues have since improved, and she has resumed her treadmill workouts.    She reports no new medications or over-the-counter drugs that could potentially harm her kidneys. She also reports not taking any diuretics, Advil, or Aleve.    FAMILY HISTORY  She denies any family history of diabetes. Her grandparents  in their 60s. Her mother  in her early 70s and her father  at the same age. Her mother had a stroke late in life.            /70   Pulse 95   Temp 36.4 °C (97.5 °F)   Resp 14   Ht 1.664 m (5' 5.51\")   SpO2 95% , Body mass index is 20.97 kg/m².    Physical Exam      Physical Exam  Constitutional:       General: She is not in acute distress.     Appearance: Normal appearance. She is normal weight. She is not ill-appearing, toxic-appearing or diaphoretic.   HENT:      Head: Normocephalic and atraumatic.      Ears:      Comments:       Nose: Nose normal.   Eyes:      Conjunctiva/sclera: Conjunctivae normal.   Cardiovascular:      Rate and Rhythm: Normal rate.   Pulmonary:      Effort: Pulmonary effort is normal.   Musculoskeletal:      Cervical back: Neck supple.   Neurological:      General: No focal deficit present.      Mental Status: She is alert.   Psychiatric:         Mood and Affect: Mood normal.         Behavior: Behavior " normal.         Thought Content: Thought content normal.         Judgment: Judgment normal.             Results  Laboratory Studies  GFR was 75 three months ago and dropped to 46 seven days ago. A1c indicates prediabetes.          Assessment & Plan  1. Prediabetes.  Her A1c levels are indicative of prediabetes. She is advised not to worry as people can remain in this range for decades. She is encouraged to maintain her healthy diet and regular exercise regimen. A repeat A1c test will be conducted in 3 months.    2. Decreased GFR.  Her GFR has decreased from 75 to 46 within a span of 3 months. There were no new medications introduced between these tests, and it is assumed the decrease was due to an illness. A comprehensive metabolic panel will be ordered today to confirm the GFR. She is encouraged to continue her hydration habits and avoid any over-the-counter medications that could harm her kidneys, such as diuretics, Advil, or Aleve.    Follow-up  Return in 3 months for follow up.               This note was created using voice recognition software (Dragon). The accuracy of the dictation is limited by the abilities of the software. I have reviewed the note prior to signing, however some errors in grammar and context are still possible. If you have any questions related to this note please do not hesitate to contact our office.

## 2024-12-09 ENCOUNTER — NON-PROVIDER VISIT (OUTPATIENT)
Dept: VASCULAR LAB | Facility: MEDICAL CENTER | Age: 79
End: 2024-12-09
Attending: INTERNAL MEDICINE
Payer: MEDICARE

## 2024-12-09 DIAGNOSIS — R93.1 ELEVATED CORONARY ARTERY CALCIUM SCORE: ICD-10-CM

## 2024-12-09 DIAGNOSIS — E78.5 HYPERLIPIDEMIA, UNSPECIFIED HYPERLIPIDEMIA TYPE: Chronic | ICD-10-CM

## 2024-12-09 PROCEDURE — 99212 OFFICE O/P EST SF 10 MIN: CPT

## 2024-12-09 RX ORDER — FENOFIBRATE 67 MG/1
1 CAPSULE ORAL DAILY
Qty: 30 CAPSULE | Refills: 11 | Status: SHIPPED | OUTPATIENT
Start: 2024-12-09 | End: 2024-12-11

## 2024-12-09 RX ORDER — ATORVASTATIN CALCIUM 80 MG/1
80 TABLET, FILM COATED ORAL NIGHTLY
Qty: 30 TABLET | Refills: 11 | Status: SHIPPED | OUTPATIENT
Start: 2024-12-09 | End: 2024-12-11

## 2024-12-09 NOTE — PROGRESS NOTES
[facility]  Date of Service: 12/9/24    Yu Kitchen presents for management of dyslipidemia    Referral Source:  To   Date First Seen in Clinic 11/11/2024    PERTINENT HLD PMHX  Age at Initial Diagnosis of Dyslipidemia: ~37 y/o       Baseline Lipids Prior to Treatment: unsure   Oldest labs available:    Latest Reference Range & Units 01/11/24 08:05   Cholesterol,Tot 100 - 199 mg/dL 198   Triglycerides 0 - 149 mg/dL 75   HDL >=40 mg/dL 79   LDL <100 mg/dL 104 (H)   (H): Data is abnormally high    History of ASCVD: Documented clinical evidence of ASCVD: and CAD and/or CAC >300    Previously Attempted Interventions for Lipids - including outcome  Statin: unknown statin      Outcome: other unknown   Non-Statin: none  Outcome: not applicable    Secondary causes/contributors (report to medical director if present):   Endocrine/Hypothyroidism:  none reported   Liver disease: none reported   Renal disease/nephrotic syndrome:  none reported  Dietary-induced (ketogenic, lean mass hyper-responder)? no  Medications: None    CURRENT MED MGMT  Current Lipid Lowering Meds:   Statin: atorvastatin 40 mg   Non-Statin: fenofibrate 160 mg once daily  Supplements: None  Current adverse drug reactions/side effects? no  Is Adherence an Issue? no    Any Family History Cardiovascular Disease? yes  Relationship: mother, stroke ; Age of Onset: 70s      There were no vitals filed for this visit.   BMI Readings from Last 1 Encounters:   11/21/24 20.97 kg/m²      Wt Readings from Last 3 Encounters:   11/07/24 58.1 kg (128 lb)   09/17/24 60.8 kg (134 lb)   09/04/24 59.9 kg (132 lb)     BP Readings from Last 2 Encounters:   11/21/24 126/70   11/07/24 110/68       DATA REVIEW:  Most Recent Lipid Panel:   Lab Results   Component Value Date/Time    CHOLSTRLTOT 145 11/14/2024 08:05 AM    LDL 72 11/14/2024 08:05 AM    HDL 61 11/14/2024 08:05 AM    TRIGLYCERIDE 58 11/14/2024 08:05 AM     Lab Results   Component Value Date/Time    LDL  "72 11/14/2024 08:05 AM     (H) 08/01/2024 08:05 AM     (H) 01/11/2024 08:05 AM      Lab Results   Component Value Date/Time    LIPOPROTA 19 08/01/2024 08:05 AM      Lab Results   Component Value Date/Time    APOB 71 11/14/2024 08:05 AM      No results found for: \"CRPHIGHSEN\"    Other Pertinent Blood Work:   Lab Results   Component Value Date    SODIUM 140 11/21/2024    POTASSIUM 4.0 11/21/2024    CHLORIDE 105 11/21/2024    CO2 26 11/21/2024    ANION 9.0 11/21/2024    GLUCOSE 95 11/21/2024    BUN 17 11/21/2024    CREATININE 0.87 11/21/2024    CALCIUM 9.1 11/21/2024    ASTSGOT 21 11/21/2024    ALTSGPT 15 11/21/2024    ALKPHOSPHAT 68 11/21/2024    TBILIRUBIN 0.6 11/21/2024    ALBUMIN 4.0 11/21/2024    AGRATIO 1.7 11/21/2024    TSHULTRASEN 1.950 08/01/2024       VASCULAR IMAGING:  CAC Score (if available): 747    ASSESSMENT AND PLAN    Patient Type, check all that apply: Secondary Prevention    Major ASCVD events: None      Evidence of genetic dyslipidemia (Familial Hyperlipidemia): No     ASCVD risk calculations (if applicable)  The ASCVD Risk score (Boo BROOKS, et al., 2019) failed to calculate., N/A    ACC/AHA Indication for Statin Therapy:  Established ASCVD: Indication for High intensity statin     Other Significant Risk Markers:  High-risk conditions: >64yr old  and Hypertension   Risk-enhancers: None  Lipoprotein(a): Favorable (<30 mg/dl or <75 nmol/L  Most recent CAC (if available): Total Calcium Score: 746.0  Percentile: Calcium score is above the 90th percentile for the patient's age and sex.     Lipid Goals:  Primary: LDL-C <70 mg/dl  Secondary apoB <70 mg/dl  At goals? no    LIFESTYLE INTERVENTIONS  TOBACCO: none  continued complete avoidance of all tobacco products   EtOH: likes to have 1 vodka tonic at night -- cutting back to 1-2 times per week   Men: No more than two standard servings per day  Women: No more than one standard serving per day  PHYSICAL ACTIVITY:  highly active lifestyle, " regular swimming, golf, elliptical, walking, treadmill daily   NUTRITION: healthy lifestyle, emphasis on fish, vegetables, lean meats     LIPID-LOWERING MEDICATION MANAGEMENT:     Statin Therapy:   INCREASE atorvastatin 40 mg to 80 mg once daily    PCSK9 Inhibitor strategy indications: ASCVD with suboptimal control of LDL-C despite maximally tolerated statin    Non-Statin Meds:   EZETIMIBE: Not currently indicated  NEXLETOL/NEXLIZET (avoid simva >20, prava >40): Not currently indicated  BAS: Not currently indicated    OMEGA-3 FAs: Not currently indicated  FIBRATE: Reduce fenofibrate to 67 mg once daily per renal indices (CrCl ~ 45 mL/min)  PCSK9 mAb: Not currently indicated  LEQVIO: Not currently indicated    Since last visit, pt LDL decreased significantly. She states this was a result of her PCP increasing her atorvastatin dose from 20 mg daily to 40 mg daily. No issues or noted SE w/ dose increase. Pt amenable to further intensification of statin today vs initiation of Leqvio.    Recommended Supplements: None     Studies Ordered at Todays Visit: None   Blood Work To Be Obtained Prior to Next Visit: Lipid panel, CMP, and ApoB  Follow-Up: 8 weeks     Pato Nichols, PharmD, BCACP    Agree with above.   If trigs remain well controlled would consider ezetimibe instead of fenofibrate in future.    Michael J Bloch, MD  Certified Clinical Lipid Specialist  Medial Director, [facility]    CC:  Heaven Parker M.D.  To, GILBERT Tran.

## 2024-12-11 RX ORDER — FENOFIBRATE 67 MG/1
67 CAPSULE ORAL
Qty: 90 CAPSULE | Refills: 3 | Status: SHIPPED | OUTPATIENT
Start: 2024-12-11

## 2024-12-11 RX ORDER — ATORVASTATIN CALCIUM 80 MG/1
80 TABLET, FILM COATED ORAL NIGHTLY
Qty: 90 TABLET | Refills: 3 | Status: SHIPPED | OUTPATIENT
Start: 2024-12-11

## 2024-12-18 ENCOUNTER — OFFICE VISIT (OUTPATIENT)
Dept: INTERNAL MEDICINE | Facility: IMAGING CENTER | Age: 79
End: 2024-12-18
Payer: MEDICARE

## 2024-12-18 VITALS
BODY MASS INDEX: 21.26 KG/M2 | HEART RATE: 92 BPM | HEIGHT: 66 IN | WEIGHT: 132.28 LBS | SYSTOLIC BLOOD PRESSURE: 112 MMHG | DIASTOLIC BLOOD PRESSURE: 62 MMHG | TEMPERATURE: 97.6 F | RESPIRATION RATE: 14 BRPM | OXYGEN SATURATION: 97 %

## 2024-12-18 DIAGNOSIS — E78.5 HYPERLIPIDEMIA, UNSPECIFIED HYPERLIPIDEMIA TYPE: ICD-10-CM

## 2024-12-18 DIAGNOSIS — I10 ESSENTIAL HYPERTENSION, BENIGN: Chronic | ICD-10-CM

## 2024-12-18 PROCEDURE — 3074F SYST BP LT 130 MM HG: CPT | Performed by: FAMILY MEDICINE

## 2024-12-18 PROCEDURE — 99214 OFFICE O/P EST MOD 30 MIN: CPT | Performed by: FAMILY MEDICINE

## 2024-12-18 PROCEDURE — 3078F DIAST BP <80 MM HG: CPT | Performed by: FAMILY MEDICINE

## 2024-12-18 ASSESSMENT — FIBROSIS 4 INDEX: FIB4 SCORE: 1.44

## 2024-12-22 NOTE — PROGRESS NOTES
Verbal consent was acquired by the patient to use [employer] ambient listening note generation during this visit     Patient is a 79 y.o.female.established patient     History of Present Illness  The patient presents for evaluation of cholesterol management.    She underwent a cardiac CT scan on 02/26/2024, which revealed significant cholesterol plaque buildup, indicating a high risk for heart disease or stroke. Due to a delay in her cardiology appointment, her atorvastatin dosage was increased from 20 mg to 40 mg, and aspirin was added to her regimen. Her lipid levels remained elevated in August 2024, with a total cholesterol of 214 and an LDL of 132. On 11/22/2024, her total cholesterol was 145, HDL was 61, and LDL was 67. She has not had any blood work done since the adjustment of her fenofibrate dosage. The lipid clinic has requested repeat blood work prior to her next appointment in February 2025. She reports experiencing palpitations at night when lying on her left side, but not during the day or while using the elliptical machine. She notes a decrease in these palpitations since the reduction in her fenofibrate dosage, even with the increase in her statin dosage.     Subsequently, she consulted with Dr. Escalona, who approved the atorvastatin 40 mg and aspirin, and added fenofibrate 260 mg to her treatment plan. However, her insurance company, [employer], denied coverage for Nexletol, a decision that was upheld upon appeal. She was then referred to the lipid clinic, where she was informed about Leqvio, a medication administered as a professional injection into the abdomen every 6 months. This medication was approved under B, and she does not have to bear the cost. Her pharmacist adjusted her fenofibrate dosage due to concerns about kidney health and increased her atorvastatin dosage to 80 mg.    MEDICATIONS  Current: atorvastatin, fenofibrate, aspirin            /62   Pulse 92   Temp 36.4 °C (97.6 °F)   " Resp 14   Ht 1.664 m (5' 5.51\")   Wt 60 kg (132 lb 4.4 oz)   SpO2 97% , Body mass index is 21.67 kg/m².    Physical Exam      Physical Exam  Constitutional:       General: She is not in acute distress.     Appearance: Normal appearance. She is normal weight. She is not ill-appearing, toxic-appearing or diaphoretic.   HENT:      Head: Normocephalic and atraumatic.      Ears:      Comments:       Nose: Nose normal.   Eyes:      Conjunctiva/sclera: Conjunctivae normal.   Cardiovascular:      Rate and Rhythm: Normal rate.   Pulmonary:      Effort: Pulmonary effort is normal.   Musculoskeletal:      Cervical back: Neck supple.   Neurological:      General: No focal deficit present.      Mental Status: She is alert.   Psychiatric:         Mood and Affect: Mood normal.         Behavior: Behavior normal.         Thought Content: Thought content normal.         Judgment: Judgment normal.             Results  Laboratory Studies  Cholesterol was 214. LDL was 132. On 11/22/2024, cholesterol was 145, HDL was 61, and LDL was 67. In November 2024, LDL was 72.    Imaging  Cardiac CT scan showed significant cholesterol plaque buildup.          Assessment & Plan  1. Cholesterol management.  Her LDL levels have shown a significant decrease to 72, likely due to the increase in atorvastatin dosage from 20 mg to 80 mg. The potential need for Leqvio injections was discussed, but it is anticipated that her LDL levels will continue to decrease, possibly eliminating the need for this treatment. She is advised to schedule an appointment for a blood draw at the end of January 2025. The blood work will include a metabolic panel to assess kidney function, cholesterol levels, and apolipoprotein B. The results will be reviewed during her next visit with the vascular medicine specialist on 02/03/2025.               This note was created using voice recognition software (Dragon). The accuracy of the dictation is limited by the abilities of the " software. I have reviewed the note prior to signing, however some errors in grammar and context are still possible. If you have any questions related to this note please do not hesitate to contact our office.

## 2025-01-28 ENCOUNTER — NON-PROVIDER VISIT (OUTPATIENT)
Dept: INTERNAL MEDICINE | Facility: IMAGING CENTER | Age: 80
End: 2025-01-28
Payer: MEDICARE

## 2025-01-28 ENCOUNTER — HOSPITAL ENCOUNTER (OUTPATIENT)
Facility: MEDICAL CENTER | Age: 80
End: 2025-01-28
Attending: NURSE PRACTITIONER
Payer: MEDICARE

## 2025-01-28 DIAGNOSIS — Z01.89 ENCOUNTER FOR ROUTINE LABORATORY TESTING: ICD-10-CM

## 2025-01-28 DIAGNOSIS — R93.1 ELEVATED CORONARY ARTERY CALCIUM SCORE: ICD-10-CM

## 2025-01-28 DIAGNOSIS — E78.5 HYPERLIPIDEMIA, UNSPECIFIED HYPERLIPIDEMIA TYPE: Chronic | ICD-10-CM

## 2025-01-28 LAB
ALBUMIN SERPL BCP-MCNC: 4.6 G/DL (ref 3.2–4.9)
ALBUMIN/GLOB SERPL: 2.1 G/DL
ALP SERPL-CCNC: 82 U/L (ref 30–99)
ALT SERPL-CCNC: 20 U/L (ref 2–50)
ANION GAP SERPL CALC-SCNC: 11 MMOL/L (ref 7–16)
AST SERPL-CCNC: 28 U/L (ref 12–45)
BILIRUB SERPL-MCNC: 0.4 MG/DL (ref 0.1–1.5)
BUN SERPL-MCNC: 19 MG/DL (ref 8–22)
CALCIUM ALBUM COR SERPL-MCNC: 9.1 MG/DL (ref 8.5–10.5)
CALCIUM SERPL-MCNC: 9.6 MG/DL (ref 8.5–10.5)
CHLORIDE SERPL-SCNC: 103 MMOL/L (ref 96–112)
CHOLEST SERPL-MCNC: 167 MG/DL (ref 100–199)
CO2 SERPL-SCNC: 26 MMOL/L (ref 20–33)
CREAT SERPL-MCNC: 0.93 MG/DL (ref 0.5–1.4)
GFR SERPLBLD CREATININE-BSD FMLA CKD-EPI: 61 ML/MIN/1.73 M 2
GLOBULIN SER CALC-MCNC: 2.2 G/DL (ref 1.9–3.5)
GLUCOSE SERPL-MCNC: 93 MG/DL (ref 65–99)
HDLC SERPL-MCNC: 78 MG/DL
LDLC SERPL CALC-MCNC: 75 MG/DL
POTASSIUM SERPL-SCNC: 3.7 MMOL/L (ref 3.6–5.5)
PROT SERPL-MCNC: 6.8 G/DL (ref 6–8.2)
SODIUM SERPL-SCNC: 140 MMOL/L (ref 135–145)
TRIGL SERPL-MCNC: 70 MG/DL (ref 0–149)

## 2025-01-28 PROCEDURE — 82172 ASSAY OF APOLIPOPROTEIN: CPT

## 2025-01-28 PROCEDURE — 80053 COMPREHEN METABOLIC PANEL: CPT

## 2025-01-28 PROCEDURE — 80061 LIPID PANEL: CPT

## 2025-01-28 PROCEDURE — 99999 PR NO CHARGE: CPT

## 2025-01-30 LAB — APO B100 SERPL-MCNC: 69 MG/DL (ref 60–117)

## 2025-01-30 NOTE — TELEPHONE ENCOUNTER
APPEAL SUBMITTED VIA -[phone] 9/19/24 PATIENT ON HIGH DOSE AND STILL HIGH LDLS   Admission navigator completed. Omitted the admission safety education, CMET, Toscano, & Yury scales as these are completed by the primary RN per the ADT Nurse update.     Patient attended Phase 2 Cardiac Rehab Exercise Session. Further documentation will be completed in [facility] and will be scanned into the medical record upon discharge.

## 2025-02-03 ENCOUNTER — NON-PROVIDER VISIT (OUTPATIENT)
Dept: VASCULAR LAB | Facility: MEDICAL CENTER | Age: 80
End: 2025-02-03
Attending: INTERNAL MEDICINE
Payer: MEDICARE

## 2025-02-03 DIAGNOSIS — E78.5 HYPERLIPIDEMIA, UNSPECIFIED HYPERLIPIDEMIA TYPE: ICD-10-CM

## 2025-02-03 PROCEDURE — 99212 OFFICE O/P EST SF 10 MIN: CPT

## 2025-02-03 RX ORDER — EZETIMIBE 10 MG/1
10 TABLET ORAL DAILY
Qty: 30 TABLET | Refills: 3 | Status: SHIPPED | OUTPATIENT
Start: 2025-02-03 | End: 2025-02-24 | Stop reason: SDUPTHER

## 2025-02-03 NOTE — PROGRESS NOTES
[facility]  Date of Service: 12/9/24    Yu Kitchen presents for management of dyslipidemia    Referral Source:  To   Date First Seen in Clinic 11/11/2024    PERTINENT HLD PMHX  Age at Initial Diagnosis of Dyslipidemia: ~39 y/o       Baseline Lipids Prior to Treatment: unsure   Oldest labs available:    Latest Reference Range & Units 01/11/24 08:05   Cholesterol,Tot 100 - 199 mg/dL 198   Triglycerides 0 - 149 mg/dL 75   HDL >=40 mg/dL 79   LDL <100 mg/dL 104 (H)   (H): Data is abnormally high    History of ASCVD: Documented clinical evidence of ASCVD: and CAD and/or CAC >300    Previously Attempted Interventions for Lipids - including outcome  Statin: unknown statin      Outcome: other unknown   Non-Statin: none  Outcome: not applicable    Secondary causes/contributors (report to medical director if present):   Endocrine/Hypothyroidism:  none reported   Liver disease: none reported   Renal disease/nephrotic syndrome:  none reported  Dietary-induced (ketogenic, lean mass hyper-responder)? no  Medications: None    CURRENT MED MGMT  Current Lipid Lowering Meds:   Statin: atorvastatin 80 mg   Non-Statin: fenofibrate 40 mg once daily  Supplements: None  Current adverse drug reactions/side effects? no  Is Adherence an Issue? no    Any Family History Cardiovascular Disease? yes  Relationship: mother, stroke ; Age of Onset: 70s      There were no vitals filed for this visit.   BMI Readings from Last 1 Encounters:   12/18/24 21.67 kg/m²      Wt Readings from Last 3 Encounters:   12/18/24 60 kg (132 lb 4.4 oz)   11/07/24 58.1 kg (128 lb)   09/17/24 60.8 kg (134 lb)     BP Readings from Last 2 Encounters:   12/18/24 112/62   11/21/24 126/70       DATA REVIEW:  Most Recent Lipid Panel:   Lab Results   Component Value Date/Time    CHOLSTRLTOT 167 01/28/2025 08:20 AM    LDL 75 01/28/2025 08:20 AM    HDL 78 01/28/2025 08:20 AM    TRIGLYCERIDE 70 01/28/2025 08:20 AM     Lab Results   Component Value Date/Time     "LDL 75 01/28/2025 08:20 AM    LDL 72 11/14/2024 08:05 AM     (H) 08/01/2024 08:05 AM      Lab Results   Component Value Date/Time    LIPOPROTA 19 08/01/2024 08:05 AM      Lab Results   Component Value Date/Time    APOB 69 01/28/2025 08:20 AM      No results found for: \"CRPHIGHSEN\"    Other Pertinent Blood Work:   Lab Results   Component Value Date    SODIUM 140 01/28/2025    POTASSIUM 3.7 01/28/2025    CHLORIDE 103 01/28/2025    CO2 26 01/28/2025    ANION 11.0 01/28/2025    GLUCOSE 93 01/28/2025    BUN 19 01/28/2025    CREATININE 0.93 01/28/2025    CALCIUM 9.6 01/28/2025    ASTSGOT 28 01/28/2025    ALTSGPT 20 01/28/2025    ALKPHOSPHAT 82 01/28/2025    TBILIRUBIN 0.4 01/28/2025    ALBUMIN 4.6 01/28/2025    AGRATIO 2.1 01/28/2025    TSHULTRASEN 1.950 08/01/2024       VASCULAR IMAGING:  CAC Score (if available): 747    ASSESSMENT AND PLAN    Patient Type, check all that apply: Secondary Prevention    Major ASCVD events: None      Evidence of genetic dyslipidemia (Familial Hyperlipidemia): No     ASCVD risk calculations (if applicable)  The ASCVD Risk score (Boo BROOKS, et al., 2019) failed to calculate., N/A    ACC/AHA Indication for Statin Therapy:  Established ASCVD: Indication for High intensity statin     Other Significant Risk Markers:  High-risk conditions: >64yr old  and Hypertension   Risk-enhancers: None  Lipoprotein(a): Favorable (<30 mg/dl or <75 nmol/L  Most recent CAC (if available): Total Calcium Score: 746.0  Percentile: Calcium score is above the 90th percentile for the patient's age and sex.     Lipid Goals:  Primary: LDL-C <70 mg/dl  Secondary apoB <70 mg/dl  At goals? no    LIFESTYLE INTERVENTIONS  TOBACCO: none  continued complete avoidance of all tobacco products   EtOH: likes to have 1 vodka tonic at night -- cutting back to 1-2 times per week   Men: No more than two standard servings per day  Women: No more than one standard serving per day  PHYSICAL ACTIVITY:  highly active lifestyle, " regular swimming, golf, elliptical, walking, treadmill daily   NUTRITION: healthy lifestyle, emphasis on fish, vegetables, lean meats     LIPID-LOWERING MEDICATION MANAGEMENT:     Statin Therapy:   Atorvastatin 80 mg once daily    PCSK9 Inhibitor strategy indications: ASCVD with suboptimal control of LDL-C despite maximally tolerated statin    Non-Statin Meds:   EZETIMIBE: Start 10 mg once daily  NEXLETOL/NEXLIZET (avoid simva >20, prava >40): Not currently indicated  BAS: Not currently indicated    OMEGA-3 FAs: Not currently indicated  FIBRATE:Stop once Zetia is started.   PCSK9 mAb: Not currently indicated  LEQVIO: Not currently indicated      Recommended Supplements: None     Studies Ordered at Todays Visit: None   Blood Work To Be Obtained Prior to Next Visit: Lipid panel, CMP, and ApoB  Follow-Up: 8-12 weeks     -Currently tolerating Atorvastatin 80 mg with no noted adverse effects  -Patient recently had dose of fenofibrate reduced and triglycerides have remained WNL,   -LDL currently at 75 which is not yet at goal, will start Zetia 10 mg daily to get patient to LDL goal <70 mg/dL. Will stop fenofibrate as the initiation of Zetia should be adequate for control.  -Follow up in 8-12 weeks on labs.    Dayton Chao, PharmD        CC:  Heaven Parker M.D.  ToMarc M.D.

## 2025-02-18 DIAGNOSIS — Z01.00 ENCOUNTER FOR COMPLETE EYE EXAM: ICD-10-CM

## 2025-02-19 NOTE — Clinical Note
REFERRAL APPROVAL NOTICE         Sent on February 19, 2025                   Yu Miller Imtiaz  [address]  [place] [address]                   Dear Ms. Kitchen,    After a careful review of the medical information and benefit coverage, [facility] has processed your referral. See below for additional details.    If applicable, you must be actively enrolled with your insurance for coverage of the authorized service. If you have any questions regarding your coverage, please contact your insurance directly.    REFERRAL INFORMATION   Referral #:  [id number]  Referred-To Provider    Referred-By Provider:  Ophthalmology    Heaven Parker M.D.   [facility]      [address]  [place] [address]  [phone] [id number]  [place] [id number] [phone]    Referral Start Date:  02/18/2025  Referral End Date:   02/18/2026             SCHEDULING  If you do not already have an appointment, please call [phone] to make an appointment.     MORE INFORMATION  If you do not already have a [facility] account, sign up at: [email or web address]  You can access your medical information, make appointments, see lab results, billing information, and more.  If you have questions regarding this referral, please contact  the [facility] Referrals department at:             [phone]. Monday - Friday 8:00AM - 5:00PM.     Sincerely,    [facility]

## 2025-02-24 ENCOUNTER — OFFICE VISIT (OUTPATIENT)
Dept: CARDIOLOGY | Facility: MEDICAL CENTER | Age: 80
End: 2025-02-24
Attending: INTERNAL MEDICINE
Payer: MEDICARE

## 2025-02-24 VITALS
HEART RATE: 80 BPM | SYSTOLIC BLOOD PRESSURE: 128 MMHG | OXYGEN SATURATION: 93 % | DIASTOLIC BLOOD PRESSURE: 70 MMHG | HEIGHT: 65 IN | RESPIRATION RATE: 14 BRPM | BODY MASS INDEX: 22.99 KG/M2 | WEIGHT: 138 LBS

## 2025-02-24 DIAGNOSIS — R93.1 AGATSTON CAC SCORE, >400: ICD-10-CM

## 2025-02-24 DIAGNOSIS — E78.5 HYPERLIPIDEMIA, UNSPECIFIED HYPERLIPIDEMIA TYPE: Chronic | ICD-10-CM

## 2025-02-24 DIAGNOSIS — I25.10 CORONARY ARTERY DISEASE INVOLVING NATIVE CORONARY ARTERY OF NATIVE HEART WITHOUT ANGINA PECTORIS: ICD-10-CM

## 2025-02-24 DIAGNOSIS — E78.00 PURE HYPERCHOLESTEROLEMIA: ICD-10-CM

## 2025-02-24 DIAGNOSIS — R93.1 ELEVATED CORONARY ARTERY CALCIUM SCORE: ICD-10-CM

## 2025-02-24 PROCEDURE — 99212 OFFICE O/P EST SF 10 MIN: CPT | Performed by: INTERNAL MEDICINE

## 2025-02-24 PROCEDURE — 99214 OFFICE O/P EST MOD 30 MIN: CPT | Performed by: INTERNAL MEDICINE

## 2025-02-24 PROCEDURE — G2211 COMPLEX E/M VISIT ADD ON: HCPCS | Performed by: INTERNAL MEDICINE

## 2025-02-24 PROCEDURE — 3078F DIAST BP <80 MM HG: CPT | Performed by: INTERNAL MEDICINE

## 2025-02-24 PROCEDURE — 3074F SYST BP LT 130 MM HG: CPT | Performed by: INTERNAL MEDICINE

## 2025-02-24 RX ORDER — LISINOPRIL 5 MG/1
5 TABLET ORAL EVERY EVENING
Qty: 100 TABLET | Refills: 4 | Status: SHIPPED | OUTPATIENT
Start: 2025-02-24

## 2025-02-24 RX ORDER — EZETIMIBE 10 MG/1
10 TABLET ORAL DAILY
Qty: 100 TABLET | Refills: 3 | Status: SHIPPED | OUTPATIENT
Start: 2025-02-24

## 2025-02-24 RX ORDER — ATORVASTATIN CALCIUM 80 MG/1
80 TABLET, FILM COATED ORAL NIGHTLY
Qty: 100 TABLET | Refills: 3 | Status: SHIPPED | OUTPATIENT
Start: 2025-02-24

## 2025-02-24 ASSESSMENT — ENCOUNTER SYMPTOMS
DOUBLE VISION: 0
ORTHOPNEA: 0
FALLS: 0
SENSORY CHANGE: 0
BLOOD IN STOOL: 0
ABDOMINAL PAIN: 0
DEPRESSION: 0
BRUISES/BLEEDS EASILY: 0
LOSS OF CONSCIOUSNESS: 0
NAUSEA: 0
EYE DISCHARGE: 0
BLURRED VISION: 0
WEIGHT LOSS: 0
HALLUCINATIONS: 0
SHORTNESS OF BREATH: 0
DIZZINESS: 0
MYALGIAS: 0
SPEECH CHANGE: 0
FEVER: 0
PND: 0
EYE PAIN: 0
PALPITATIONS: 0
HEADACHES: 0
CLAUDICATION: 0
VOMITING: 0
CHILLS: 0
COUGH: 0

## 2025-02-24 ASSESSMENT — FIBROSIS 4 INDEX: FIB4 SCORE: 1.69

## 2025-02-24 NOTE — PROGRESS NOTES
Chief Complaint   Patient presents with    Hyperlipidemia    Hypertension       Subjective     Yu Kitchen is an 80 y.o. female who presents today for cardiac care due to elevated coronary Ca score.    I have personally interpreted EKG today with patient, there is no evidence of acute coronary syndrome, no evidence of prior infarct, normal VT and QT interval, no significant conduction disease. Sinus rhythm.    Yu Kitchen does not have any history of heart attack arrhythmias in the past. she never had cardiac catheterization or ablations procedure in the past. At this time, she denies having chest pain shortness of breath presyncopal syncopal episodes.    I personally interpreted the images of her stress test which did not show evidence of coronary ischemia.    She does drink ETOH daily.    I have independently interpreted and reviewed blood tests results with patient in clinic which shows LDL level of 75 down from 132 up from 104, triglycerides level of 70 down from 155 up from 75, GFR of 61, K of 3.7. Hgba1c of 6. Lpa of 19.    I have independently interpreted and reviewed echocardiogram's actual images with patient which showed normal left ventricular systolic function. No wall motion abnormality. No evidence of pulmonary hypertension. No significant valvular disease.      Past Medical History:   Diagnosis Date    Acute meniscal tear of knee 06/02/2015    Formatting of this note might be different from the original. Symptoms resolved w/ elliptical exercise, avoiding running    Arthritis     Hypercholesteremia     Hypertension     Pathological fracture due to age-related osteoporosis 08/07/2023    Formatting of this note might be different from the original. Per referral to [facility] center on 8/7/23    UTI (urinary tract infection)      Past Surgical History:   Procedure Laterality Date    TONSILLECTOMY       Family History   Problem Relation Age of Onset    Cancer Mother         Breast     Hypertension Mother     Stroke Mother     Cancer Father 72        pancreatic    Cancer Sister         ovarian    No Known Problems Daughter      Social History     Socioeconomic History    Marital status:      Spouse name: Not on file    Number of children: Not on file    Years of education: Not on file    Highest education level: Not on file   Occupational History    Not on file   Tobacco Use    Smoking status: Never    Smokeless tobacco: Never   Substance and Sexual Activity    Alcohol use: Yes     Alcohol/week: 1.8 - 2.4 oz     Types: 3 - 4 Glasses of wine per week     Comment: trying to stop completly per provider    Drug use: Not Currently    Sexual activity: Not on file   Other Topics Concern    Not on file   Social History Narrative    lives with her     She has 1 daughter and 2 granddaughters    Moved here, prior care was at [place]     Social Drivers of Health     Financial Resource Strain: Not on file   Food Insecurity: Not on file   Transportation Needs: Not on file   Physical Activity: Not on file   Stress: Not on file   Social Connections: Not on file   Intimate Partner Violence: Not on file   Housing Stability: Not on file     Allergies   Allergen Reactions    Procaine      Other reaction(s): Tachycardia     Outpatient Encounter Medications as of 2/24/2025   Medication Sig Dispense Refill    atorvastatin (LIPITOR) 80 MG tablet Take 1 Tablet by mouth every evening. 100 Tablet 3    ezetimibe (ZETIA) 10 MG Tab Take 1 Tablet by mouth every day. 100 Tablet 3    lisinopril (PRINIVIL) 5 MG Tab Take 1 Tablet by mouth every evening. 100 Tablet 4    aspirin (ASA) 81 MG Chew Tab chewable tablet Chew 81 mg every day.      tretinoin (RETIN-A) 0.1 % cream APPLY THIN LAYER TO FACE AFTER CLEANSING STARTING WITH 2 TO 3 TIMES WEEKLY THEN INCREASE TO NIGHTLY AS TOLERATED. USE SUNSCREEN IN THE MORNING      Cholecalciferol (D3 PO) Take 1 Capsule by mouth every day.      diphenhydrAMINE HCl (ALLERGY  "MEDICATION PO) Take 0.5 Tablets by mouth every day. (OTC)      ibuprofen (MOTRIN) 200 MG Tab Take 200 mg by mouth every 6 hours as needed. Indications: Pain      [DISCONTINUED] ezetimibe (ZETIA) 10 MG Tab Take 1 Tablet by mouth every day. 30 Tablet 3    [DISCONTINUED] atorvastatin (LIPITOR) 80 MG tablet TAKE 1 TABLET BY MOUTH EVERY EVENING 90 Tablet 3    [DISCONTINUED] lisinopril (PRINIVIL) 5 MG Tab Take 1 Tablet by mouth every evening. 100 Tablet 4    Multiple Vitamins-Minerals (PRESERVISION AREDS 2 PO) Take 1 Capsule by mouth every day. (Patient not taking: Reported on 2/24/2025)       No facility-administered encounter medications on file as of 2/24/2025.     Review of Systems   Constitutional:  Negative for chills, fever, malaise/fatigue and weight loss.   HENT:  Negative for ear discharge, ear pain, hearing loss and nosebleeds.    Eyes:  Negative for blurred vision, double vision, pain and discharge.   Respiratory:  Negative for cough and shortness of breath.    Cardiovascular:  Negative for chest pain, palpitations, orthopnea, claudication, leg swelling and PND.   Gastrointestinal:  Negative for abdominal pain, blood in stool, melena, nausea and vomiting.   Genitourinary:  Negative for dysuria and hematuria.   Musculoskeletal:  Negative for falls, joint pain and myalgias.   Skin:  Negative for itching and rash.   Neurological:  Negative for dizziness, sensory change, speech change, loss of consciousness and headaches.   Endo/Heme/Allergies:  Negative for environmental allergies. Does not bruise/bleed easily.   Psychiatric/Behavioral:  Negative for depression, hallucinations and suicidal ideas.               Objective     /70 (BP Location: Left arm, Patient Position: Sitting, BP Cuff Size: Adult)   Pulse 80   Resp 14   Ht 1.651 m (5' 5\")   Wt 62.6 kg (138 lb)   SpO2 93%   BMI 22.96 kg/m²     Physical Exam  Vitals and nursing note reviewed.   Constitutional:       General: She is not in acute " distress.     Appearance: She is not diaphoretic.   HENT:      Head: Normocephalic and atraumatic.      Right Ear: External ear normal.      Left Ear: External ear normal.      Nose: No congestion or rhinorrhea.   Eyes:      General:         Right eye: No discharge.         Left eye: No discharge.   Neck:      Thyroid: No thyromegaly.      Vascular: No JVD.   Cardiovascular:      Rate and Rhythm: Normal rate and regular rhythm.      Pulses: Normal pulses.   Pulmonary:      Effort: No respiratory distress.   Abdominal:      General: There is no distension.      Tenderness: There is no abdominal tenderness.   Musculoskeletal:         General: No swelling or tenderness.      Right lower leg: No edema.      Left lower leg: No edema.   Skin:     General: Skin is warm and dry.   Neurological:      Mental Status: She is alert and oriented to person, place, and time.      Cranial Nerves: No cranial nerve deficit.   Psychiatric:         Behavior: Behavior normal.                Assessment & Plan     1. Hyperlipidemia, unspecified hyperlipidemia type  atorvastatin (LIPITOR) 80 MG tablet    ezetimibe (ZETIA) 10 MG Tab      2. Elevated coronary artery calcium score  atorvastatin (LIPITOR) 80 MG tablet    Basic Metabolic Panel    LIPID PANEL      3. Agatston CAC score, >400  lisinopril (PRINIVIL) 5 MG Tab    Basic Metabolic Panel    LIPID PANEL      4. Coronary artery disease involving native coronary artery of native heart without angina pectoris  lisinopril (PRINIVIL) 5 MG Tab      5. Pure hypercholesterolemia  lisinopril (PRINIVIL) 5 MG Tab    Basic Metabolic Panel    LIPID PANEL              Medical Decision Making: Today's Assessment/Status/Plan:   Blood pressure is well controlled. Continue Lisinopril 5 mg daily.  At this time patient is clinically stable in terms of her cardiac standpoint.    Continue Atorvastatin 40 mg daily, Zetia 10 mg daily.    This visit encounter signifies the visit complexity inherent to evaluation  and management associated with medical care services that serve as the continuing focal point for all needed health care services and/or with medical care services that are part of ongoing care related to this patient's single, serious condition, complex cardiac condition.    Marc Escalona M.D.

## 2025-04-21 ENCOUNTER — APPOINTMENT (OUTPATIENT)
Dept: VASCULAR LAB | Facility: MEDICAL CENTER | Age: 80
End: 2025-04-21
Attending: INTERNAL MEDICINE
Payer: MEDICARE

## 2025-04-30 ENCOUNTER — NON-PROVIDER VISIT (OUTPATIENT)
Dept: INTERNAL MEDICINE | Facility: IMAGING CENTER | Age: 80
End: 2025-04-30
Payer: MEDICARE

## 2025-04-30 ENCOUNTER — OFFICE VISIT (OUTPATIENT)
Dept: INTERNAL MEDICINE | Facility: IMAGING CENTER | Age: 80
End: 2025-04-30
Payer: MEDICARE

## 2025-04-30 ENCOUNTER — HOSPITAL ENCOUNTER (OUTPATIENT)
Facility: MEDICAL CENTER | Age: 80
End: 2025-04-30
Attending: NURSE PRACTITIONER
Payer: MEDICARE

## 2025-04-30 VITALS
SYSTOLIC BLOOD PRESSURE: 135 MMHG | RESPIRATION RATE: 14 BRPM | OXYGEN SATURATION: 96 % | WEIGHT: 137 LBS | TEMPERATURE: 97.3 F | HEART RATE: 85 BPM | BODY MASS INDEX: 22.02 KG/M2 | DIASTOLIC BLOOD PRESSURE: 74 MMHG | HEIGHT: 66 IN

## 2025-04-30 DIAGNOSIS — M18.11 PRIMARY OSTEOARTHRITIS OF FIRST CARPOMETACARPAL JOINT OF RIGHT HAND: ICD-10-CM

## 2025-04-30 DIAGNOSIS — M25.461 BILATERAL KNEE EFFUSIONS: ICD-10-CM

## 2025-04-30 DIAGNOSIS — M79.641 RIGHT HAND PAIN: ICD-10-CM

## 2025-04-30 DIAGNOSIS — I10 ESSENTIAL HYPERTENSION, BENIGN: Chronic | ICD-10-CM

## 2025-04-30 DIAGNOSIS — E78.5 HYPERLIPIDEMIA, UNSPECIFIED HYPERLIPIDEMIA TYPE: ICD-10-CM

## 2025-04-30 DIAGNOSIS — M25.462 BILATERAL KNEE EFFUSIONS: ICD-10-CM

## 2025-04-30 LAB
ALBUMIN SERPL BCP-MCNC: 4.2 G/DL (ref 3.2–4.9)
ALBUMIN/GLOB SERPL: 1.9 G/DL
ALP SERPL-CCNC: 74 U/L (ref 30–99)
ALT SERPL-CCNC: 21 U/L (ref 2–50)
ANION GAP SERPL CALC-SCNC: 8 MMOL/L (ref 7–16)
AST SERPL-CCNC: 28 U/L (ref 12–45)
BILIRUB SERPL-MCNC: 0.8 MG/DL (ref 0.1–1.5)
BUN SERPL-MCNC: 21 MG/DL (ref 8–22)
CALCIUM ALBUM COR SERPL-MCNC: 9.2 MG/DL (ref 8.5–10.5)
CALCIUM SERPL-MCNC: 9.4 MG/DL (ref 8.5–10.5)
CHLORIDE SERPL-SCNC: 105 MMOL/L (ref 96–112)
CHOLEST SERPL-MCNC: 156 MG/DL (ref 100–199)
CO2 SERPL-SCNC: 28 MMOL/L (ref 20–33)
CREAT SERPL-MCNC: 0.89 MG/DL (ref 0.5–1.4)
GFR SERPLBLD CREATININE-BSD FMLA CKD-EPI: 65 ML/MIN/1.73 M 2
GLOBULIN SER CALC-MCNC: 2.2 G/DL (ref 1.9–3.5)
GLUCOSE SERPL-MCNC: 99 MG/DL (ref 65–99)
HDLC SERPL-MCNC: 67 MG/DL
LDLC SERPL CALC-MCNC: 61 MG/DL
POTASSIUM SERPL-SCNC: 4 MMOL/L (ref 3.6–5.5)
PROT SERPL-MCNC: 6.4 G/DL (ref 6–8.2)
SODIUM SERPL-SCNC: 141 MMOL/L (ref 135–145)
TRIGL SERPL-MCNC: 138 MG/DL (ref 0–149)

## 2025-04-30 PROCEDURE — 3075F SYST BP GE 130 - 139MM HG: CPT | Performed by: FAMILY MEDICINE

## 2025-04-30 PROCEDURE — 99214 OFFICE O/P EST MOD 30 MIN: CPT | Performed by: FAMILY MEDICINE

## 2025-04-30 PROCEDURE — 80061 LIPID PANEL: CPT

## 2025-04-30 PROCEDURE — 82172 ASSAY OF APOLIPOPROTEIN: CPT

## 2025-04-30 PROCEDURE — 80053 COMPREHEN METABOLIC PANEL: CPT

## 2025-04-30 PROCEDURE — 3078F DIAST BP <80 MM HG: CPT | Performed by: FAMILY MEDICINE

## 2025-04-30 RX ORDER — PANTOPRAZOLE SODIUM 40 MG/1
TABLET, DELAYED RELEASE ORAL
COMMUNITY
Start: 2025-04-19

## 2025-04-30 RX ORDER — TRETINOIN 1 MG/G
1 CREAM TOPICAL NIGHTLY
Qty: 45 G | Refills: 3 | Status: SHIPPED | OUTPATIENT
Start: 2025-04-30

## 2025-04-30 ASSESSMENT — FIBROSIS 4 INDEX: FIB4 SCORE: 1.69

## 2025-04-30 NOTE — PROGRESS NOTES
Verbal consent was acquired by the patient to use [employer] ambient listening note generation during this visit     Patient is a 80 y.o.female.established patient     History of Present Illness  The patient presents for evaluation of osteoarthritis, ecchymosis, and medication management.    Osteoarthritis  The patient reports experiencing osteoarthritis for approximately 15 to 20 years, initially manifesting in the hands. Despite maintaining an active lifestyle, the patient notes discomfort in both knees, particularly when descending stairs or exiting a vehicle, necessitating the use of a handrail. Bilateral knee swelling is also reported. Nonsteroidal anti-inflammatory drugs (NSAIDs) have been utilized to manage symptoms, but alternative treatments are sought due to concerns about overuse. The patient has not previously tried Voltaren gel or acetaminophen. Occasional nocturnal hip or knee pain is reported, but ibuprofen is avoided. The patient has a history of a knee fracture 2 to 3 years ago and a foot and ankle fracture last fall, managed by Dr. Zamudio. A plate was worn in the shoe during a trip to [facility], which was slightly uncomfortable but manageable. After 10 weeks, fractures were reported as healed, and the plate was discontinued.  - Onset: Approximately 15 to 20 years ago.  - Location: Initially in the hands, now in both knees.  - Duration: Chronic, with occasional nocturnal hip or knee pain.  - Character: Discomfort in knees, bilateral knee swelling.  - Alleviating/Aggravating Factors: NSAIDs used to manage symptoms; discomfort when descending stairs or exiting a vehicle; avoiding ibuprofen.  - Severity: Requires use of handrail; seeking alternative treatments due to concerns about NSAID overuse.    Ecchymosis  The patient follows a daily regimen of aspirin, with an increase in ecchymosis noted. Turmeric is incorporated into the diet once a week.  - Onset: Not specified.  - Character: Increase in  "ecchymosis.  - Alleviating/Aggravating Factors: Daily aspirin regimen; turmeric once a week.    Medication Management  The patient requests a refill of tretinoin prescription, used sparingly, approximately twice a week at night. The last prescription of 45 g lasted a year.  - Onset: Not specified.  - Duration: Last prescription lasted a year.  - Character: Used sparingly, approximately twice a week at night.    Pantoprazole is taken every morning except this morning. Swallowing has been generally good, though occasional episodes of choking occur. An esophagogastroduodenoscopy (EGD) was performed, and results were reported as normal.  - Onset: Not specified.  - Character: Occasional episodes of choking.  - Alleviating/Aggravating Factors: Pantoprazole taken every morning except this morning.  - Severity: EGD results reported as normal.    FAMILY HISTORY  Her mother had a stroke.            /74   Pulse 85   Temp 36.3 °C (97.3 °F)   Resp 14   Ht 1.664 m (5' 5.51\")   Wt 62.1 kg (137 lb)   SpO2 96% , Body mass index is 22.44 kg/m².    Physical Exam  General Appearance: Normal.  Vital signs: Within normal limits.  HEENT: Within normal limits.  Respiratory: Within normal limits.  Extremities: Both knees are swollen and warm.  Skin: Warm and dry, no rash.  Neurological: Normal.    Physical Exam           No visits with results within 1 Month(s) from this visit.   Latest known visit with results is:   Hospital Outpatient Visit on 01/28/2025   Component Date Value Ref Range Status   • Apolipoprotein-B 01/28/2025 69  60 - 117 mg/dL Final    Comment: REFERENCE INTERVAL: Apolipoprotein B  A desirable fasting serum Apo B concentration for the prevention  of atherosclerotic cardiovascular disease in adults is less than  90 mg/dL.  A fasting serum Apo B concentration of 130 mg/dL or  greater corresponds to a LDL cholesterol concentration greater  than 160 mg/dL and constitutes a risk enhancing factor " for  atherosclerotic cardiovascular disease in adults.  Performed By: [facility]  [address] [place] [id number]  : Sathya Silver MD, PhD  CLIA Number: [id number]     • Cholesterol,Tot 01/28/2025 167  100 - 199 mg/dL Final   • Triglycerides 01/28/2025 70  0 - 149 mg/dL Final   • HDL 01/28/2025 78  >=40 mg/dL Final   • LDL 01/28/2025 75  <100 mg/dL Final   • Sodium 01/28/2025 140  135 - 145 mmol/L Final   • Potassium 01/28/2025 3.7  3.6 - 5.5 mmol/L Final   • Chloride 01/28/2025 103  96 - 112 mmol/L Final   • Co2 01/28/2025 26  20 - 33 mmol/L Final   • Anion Gap 01/28/2025 11.0  7.0 - 16.0 Final   • Glucose 01/28/2025 93  65 - 99 mg/dL Final   • Bun 01/28/2025 19  8 - 22 mg/dL Final   • Creatinine 01/28/2025 0.93  0.50 - 1.40 mg/dL Final   • Calcium 01/28/2025 9.6  8.5 - 10.5 mg/dL Final   • Correct Calcium 01/28/2025 9.1  8.5 - 10.5 mg/dL Final   • AST(SGOT) 01/28/2025 28  12 - 45 U/L Final   • ALT(SGPT) 01/28/2025 20  2 - 50 U/L Final   • Alkaline Phosphatase 01/28/2025 82  30 - 99 U/L Final   • Total Bilirubin 01/28/2025 0.4  0.1 - 1.5 mg/dL Final   • Albumin 01/28/2025 4.6  3.2 - 4.9 g/dL Final   • Total Protein 01/28/2025 6.8  6.0 - 8.2 g/dL Final   • Globulin 01/28/2025 2.2  1.9 - 3.5 g/dL Final   • A-G Ratio 01/28/2025 2.1  g/dL Final   • GFR (CKD-EPI) 01/28/2025 61  >60 mL/min/1.73 m 2 Final    Comment: Estimated Glomerular Filtration Rate is calculated using  race neutral CKD-EPI 2021 equation per NKF-ASN recommendations.                Assessment & Plan  Arthritis  Reports worsening arthritis, particularly affecting her knees, with significant pain when walking downstairs and noticeable swelling.  Diagnostic plan: Referral to a sports medicine specialist will be initiated for potential fluid aspiration from the knees.  Treatment plan: Suggested use of a neoprene brace for additional support. Advised to apply Voltaren gel topically for pain relief. If necessary,  "Tylenol can be used as an additional analgesic.    Bruising  Experiences bruising, likely exacerbated by daily aspirin intake.  Diagnostic plan:   Treatment plan: Continuation of turmeric in the diet recommended due to its anti-inflammatory properties, but monitor for any increase in bruising. Discussed the safety of Tylenol as an alternative analgesic that does not affect the stomach or kidneys.    Medication Management  Pantoprazole is confirmed as part of the current medication regimen for digestive tract issues.  Diagnostic plan:   Treatment plan: Prescription for tretinoin 0.1% will be provided, to be applied twice weekly at night.    Pain Management  Suggested the use of Voltaren gel for arthritis pain, available over the counter.  Diagnostic plan:   Treatment plan: Discussed the potential use of CBD gel as an alternative topical treatment for arthritis pain. Recommended experimenting with both Voltaren and CBD gel to determine which provides better relief.    Follow-up:                 1. Essential hypertension, benign  ***    2. Right hand pain  ***    3. Primary osteoarthritis of first carpometacarpal joint of right hand  ***              Note to patients:  This physician note is written for the purpose of communication between medical providers and billing purposes.  There may be aspects of this documentation that are simplified for efficiency and clarity.  Physician notes are not intended to capture the entirety of the patient experience.  If you have questions about the way your medical condition and care was documented, please do not hesitate to bring this up at your next visit.     Portions of this chart may have been created with Dragon voice recognition software.  Occasional wrong-word or \"sound alike\" substitutions may have occurred due to the inherent limitations of voice recognition software.  Please read the chart carefully and recognize, using context, where the substitutions have occurred.    " "documentation that are simplified for efficiency and clarity.  Physician notes are not intended to capture the entirety of the patient experience.  If you have questions about the way your medical condition and care was documented, please do not hesitate to bring this up at your next visit.     Portions of this chart may have been created with Dragon voice recognition software.  Occasional wrong-word or \"sound alike\" substitutions may have occurred due to the inherent limitations of voice recognition software.  Please read the chart carefully and recognize, using context, where the substitutions have occurred.    "

## 2025-05-03 LAB — APO B100 SERPL-MCNC: 64 MG/DL (ref 60–117)

## 2025-05-06 ENCOUNTER — NON-PROVIDER VISIT (OUTPATIENT)
Dept: VASCULAR LAB | Facility: MEDICAL CENTER | Age: 80
End: 2025-05-06
Attending: INTERNAL MEDICINE
Payer: MEDICARE

## 2025-05-06 DIAGNOSIS — E78.5 HYPERLIPIDEMIA, UNSPECIFIED HYPERLIPIDEMIA TYPE: Chronic | ICD-10-CM

## 2025-05-06 PROCEDURE — 99212 OFFICE O/P EST SF 10 MIN: CPT | Performed by: PHARMACIST

## 2025-05-06 NOTE — PROGRESS NOTES
[facility]  Date of Referral: 10/4/24    Yu Kitchen presents for management of dyslipidemia    Referral Source:  To   Date First Seen in Clinic 11/11/2024    PERTINENT HLD PMHX  Age at Initial Diagnosis of Dyslipidemia: ~37 y/o       Baseline Lipids Prior to Treatment: unsure   Oldest labs available:    Latest Reference Range & Units 01/11/24 08:05   Cholesterol,Tot 100 - 199 mg/dL 198   Triglycerides 0 - 149 mg/dL 75   HDL >=40 mg/dL 79   LDL <100 mg/dL 104 (H)   (H): Data is abnormally high    History of ASCVD: Documented clinical evidence of ASCVD: and CAD and/or CAC >300    Previously Attempted Interventions for Lipids - including outcome  Statin: unknown statin      Outcome: other unknown   Non-Statin: none  Outcome: not applicable    Secondary causes/contributors (report to medical director if present):   Endocrine/Hypothyroidism:  none reported   Liver disease: none reported   Renal disease/nephrotic syndrome:  none reported  Dietary-induced (ketogenic, lean mass hyper-responder)? no  Medications: None    CURRENT MED MGMT  Current Lipid Lowering Meds:   Statin: atorvastatin 80 mg   Non-Statin: ezetimibe 10 mg once daily  Supplements: None  Current adverse drug reactions/side effects? no  Is Adherence an Issue? no    Any Family History Cardiovascular Disease? yes  Relationship: mother, stroke ; Age of Onset: 70s      There were no vitals filed for this visit.   BMI Readings from Last 1 Encounters:   04/30/25 22.44 kg/m²      Wt Readings from Last 3 Encounters:   04/30/25 62.1 kg (137 lb)   02/24/25 62.6 kg (138 lb)   12/18/24 60 kg (132 lb 4.4 oz)     BP Readings from Last 2 Encounters:   04/30/25 135/74   02/24/25 128/70       DATA REVIEW:  Most Recent Lipid Panel:   Lab Results   Component Value Date/Time    CHOLSTRLTOT 156 04/30/2025 08:50 AM    LDL 61 04/30/2025 08:50 AM    HDL 67 04/30/2025 08:50 AM    TRIGLYCERIDE 138 04/30/2025 08:50 AM     Lab Results   Component Value Date/Time     "LDL 61 04/30/2025 08:50 AM    LDL 75 01/28/2025 08:20 AM    LDL 72 11/14/2024 08:05 AM      Lab Results   Component Value Date/Time    LIPOPROTA 19 08/01/2024 08:05 AM      Lab Results   Component Value Date/Time    APOB 64 04/30/2025 08:50 AM      No results found for: \"CRPHIGHSEN\"    Other Pertinent Blood Work:   Lab Results   Component Value Date    SODIUM 141 04/30/2025    POTASSIUM 4.0 04/30/2025    CHLORIDE 105 04/30/2025    CO2 28 04/30/2025    ANION 8.0 04/30/2025    GLUCOSE 99 04/30/2025    BUN 21 04/30/2025    CREATININE 0.89 04/30/2025    CALCIUM 9.4 04/30/2025    ASTSGOT 28 04/30/2025    ALTSGPT 21 04/30/2025    ALKPHOSPHAT 74 04/30/2025    TBILIRUBIN 0.8 04/30/2025    ALBUMIN 4.2 04/30/2025    AGRATIO 1.9 04/30/2025    TSHULTRASEN 1.950 08/01/2024       VASCULAR IMAGING:  CAC Score (if available): 747    ASSESSMENT AND PLAN    Patient Type, check all that apply: Secondary Prevention    Major ASCVD events: None      Evidence of genetic dyslipidemia (Familial Hyperlipidemia): No     ASCVD risk calculations (if applicable)  The ASCVD Risk score (Boo BROOKS, et al., 2019) failed to calculate., N/A    ACC/AHA Indication for Statin Therapy:  Established ASCVD: Indication for High intensity statin     Other Significant Risk Markers:  High-risk conditions: >64yr old  and Hypertension   Risk-enhancers: None  Lipoprotein(a): Favorable (<30 mg/dl or <75 nmol/L  Most recent CAC (if available): Total Calcium Score: 746.0  Percentile: Calcium score is above the 90th percentile for the patient's age and sex.     Lipid Goals:  Primary: LDL-C <70 mg/dl  Secondary apoB <70 mg/dl  At goals? no    LIFESTYLE INTERVENTIONS  TOBACCO: none  continued complete avoidance of all tobacco products   EtOH: 1 vodka tonic/OJ at night (1 oz vodka)   Men: No more than two standard servings per day  Women: No more than one standard serving per day  PHYSICAL ACTIVITY:  highly active lifestyle, regular swimming, golf, elliptical, walking, " treadmill daily   NUTRITION: healthy lifestyle, emphasis on fish, vegetables & fruit, lean meats     LIPID-LOWERING MEDICATION MANAGEMENT:     Statin Therapy:   Atorvastatin 80 mg once daily    PCSK9 Inhibitor strategy indications: ASCVD with suboptimal control of LDL-C despite maximally tolerated statin    Non-Statin Meds:   EZETIMIBE: Continue 10 mg once daily  NEXLETOL/NEXLIZET (avoid simva >20, prava >40): Not currently indicated  BAS: Not currently indicated    OMEGA-3 FAs: Not currently indicated  FIBRATE: Not currently indicated - will consider (re-)addition to lipid regimen if TG's elevate above goal at next f/u.   PCSK9 mAb: Not currently indicated  LEQVIO: Not currently indicated    Assessment:  Since last visit, pt was able to DC fenofibrate and initiate ezetimibe. No noted issues or ADR's.  Repeat lipid panel shows all markers now at goal.   Of note, pt TG's increased significantly - will need to CTM for possible resumption of fibrate.    Recommended Supplements: None     Studies Ordered at Todays Visit: None   Blood Work To Be Obtained Prior to Next Visit: Lipid panel, CMP, and ApoB  Follow-Up: 8 weeks to assess TG's    Pato Nichols, PharmD, BCACP    CC:  Heaven Parker M.D.  ToMarc M.D.

## 2025-05-14 NOTE — Clinical Note
REFERRAL APPROVAL NOTICE         Sent on May 14, 2025                   Yu Coynet  [address]  [place] [address]                   Dear Ms. Kitchen,    After a careful review of the medical information and benefit coverage, [facility] has processed your referral. See below for additional details.    If applicable, you must be actively enrolled with your insurance for coverage of the authorized service. If you have any questions regarding your coverage, please contact your insurance directly.    REFERRAL INFORMATION   Referral #:  [id number]  Referred-To Department    Referred-By Provider:  Orthopedics    Heaven Parker M.D.   [facility] (joint)      [address]  [place] [address]  [phone] [address]  [place] [address]  [phone]    Referral Start Date:  05/06/2025  Referral End Date:   05/06/2026             SCHEDULING  If you do not already have an appointment, please call [phone] to make an appointment.     MORE INFORMATION  If you do not already have a [employer] account, sign up at: [email or web address]  You can access your medical information, make appointments, see lab results, billing information, and more.  If you have questions regarding this referral, please contact  the [facility] Referrals department at:             [phone]. Monday - Friday 8:00AM - 5:00PM.     Sincerely,    [facility]

## 2025-06-20 ENCOUNTER — OFFICE VISIT (OUTPATIENT)
Dept: INTERNAL MEDICINE | Facility: IMAGING CENTER | Age: 80
End: 2025-06-20
Payer: MEDICARE

## 2025-06-20 VITALS
HEART RATE: 82 BPM | OXYGEN SATURATION: 96 % | RESPIRATION RATE: 14 BRPM | BODY MASS INDEX: 22.44 KG/M2 | TEMPERATURE: 97.2 F | SYSTOLIC BLOOD PRESSURE: 128 MMHG | HEIGHT: 66 IN | DIASTOLIC BLOOD PRESSURE: 70 MMHG

## 2025-06-20 DIAGNOSIS — S06.0X0A CLOSED HEAD INJURY WITH CONCUSSION, WITHOUT LOSS OF CONSCIOUSNESS, INITIAL ENCOUNTER: Primary | ICD-10-CM

## 2025-06-20 DIAGNOSIS — I10 ESSENTIAL HYPERTENSION, BENIGN: Chronic | ICD-10-CM
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
## 2025-07-02 DIAGNOSIS — S06.0X0A CLOSED HEAD INJURY WITH CONCUSSION, WITHOUT LOSS OF CONSCIOUSNESS, INITIAL ENCOUNTER: Primary | ICD-10-CM

## 2025-07-07 ENCOUNTER — HOSPITAL ENCOUNTER (OUTPATIENT)
Dept: RADIOLOGY | Facility: MEDICAL CENTER | Age: 80
End: 2025-07-07
Attending: FAMILY MEDICINE
Payer: MEDICARE

## 2025-07-07 DIAGNOSIS — S06.0X0A CLOSED HEAD INJURY WITH CONCUSSION, WITHOUT LOSS OF CONSCIOUSNESS, INITIAL ENCOUNTER: ICD-10-CM

## 2025-07-07 PROCEDURE — 70450 CT HEAD/BRAIN W/O DYE: CPT

## 2025-07-08 ENCOUNTER — APPOINTMENT (OUTPATIENT)
Dept: VASCULAR LAB | Facility: MEDICAL CENTER | Age: 80
End: 2025-07-08
Payer: MEDICARE

## 2025-07-08 ENCOUNTER — HOSPITAL ENCOUNTER (OUTPATIENT)
Facility: MEDICAL CENTER | Age: 80
End: 2025-07-08
Attending: NURSE PRACTITIONER
Payer: MEDICARE

## 2025-07-08 ENCOUNTER — NON-PROVIDER VISIT (OUTPATIENT)
Dept: INTERNAL MEDICINE | Facility: IMAGING CENTER | Age: 80
End: 2025-07-08
Payer: MEDICARE

## 2025-07-08 DIAGNOSIS — E78.5 HYPERLIPIDEMIA, UNSPECIFIED HYPERLIPIDEMIA TYPE: Chronic | ICD-10-CM

## 2025-07-08 LAB
ALBUMIN SERPL BCP-MCNC: 4.3 G/DL (ref 3.2–4.9)
ALBUMIN/GLOB SERPL: 2 G/DL
ALP SERPL-CCNC: 77 U/L (ref 30–99)
ALT SERPL-CCNC: 19 U/L (ref 2–50)
ANION GAP SERPL CALC-SCNC: 11 MMOL/L (ref 7–16)
AST SERPL-CCNC: 25 U/L (ref 12–45)
BILIRUB SERPL-MCNC: 0.7 MG/DL (ref 0.1–1.5)
BUN SERPL-MCNC: 21 MG/DL (ref 8–22)
CALCIUM ALBUM COR SERPL-MCNC: 8.9 MG/DL (ref 8.5–10.5)
CALCIUM SERPL-MCNC: 9.1 MG/DL (ref 8.5–10.5)
CHLORIDE SERPL-SCNC: 104 MMOL/L (ref 96–112)
CHOLEST SERPL-MCNC: 139 MG/DL (ref 100–199)
CO2 SERPL-SCNC: 25 MMOL/L (ref 20–33)
CREAT SERPL-MCNC: 0.96 MG/DL (ref 0.5–1.4)
GFR SERPLBLD CREATININE-BSD FMLA CKD-EPI: 59 ML/MIN/1.73 M 2
GLOBULIN SER CALC-MCNC: 2.1 G/DL (ref 1.9–3.5)
GLUCOSE SERPL-MCNC: 95 MG/DL (ref 65–99)
HDLC SERPL-MCNC: 66 MG/DL
LDLC SERPL CALC-MCNC: 55 MG/DL
POTASSIUM SERPL-SCNC: 4 MMOL/L (ref 3.6–5.5)
PROT SERPL-MCNC: 6.4 G/DL (ref 6–8.2)
SODIUM SERPL-SCNC: 140 MMOL/L (ref 135–145)
TRIGL SERPL-MCNC: 91 MG/DL (ref 0–149)

## 2025-07-08 PROCEDURE — 80053 COMPREHEN METABOLIC PANEL: CPT

## 2025-07-08 PROCEDURE — 82172 ASSAY OF APOLIPOPROTEIN: CPT

## 2025-07-08 PROCEDURE — 80061 LIPID PANEL: CPT

## 2025-07-08 NOTE — PROGRESS NOTES
Add 71187 Cpt? (Important Note: In 2017 The Use Of 23037 Is Being Tracked By Cms To Determine Future Global Period Reimbursement For Global Periods): yes Labs drawn.   Detail Level: Detailed

## 2025-07-11 ENCOUNTER — OFFICE VISIT (OUTPATIENT)
Dept: VASCULAR LAB | Facility: MEDICAL CENTER | Age: 80
End: 2025-07-11
Attending: INTERNAL MEDICINE
Payer: MEDICARE

## 2025-07-11 VITALS — SYSTOLIC BLOOD PRESSURE: 122 MMHG | HEART RATE: 81 BPM | DIASTOLIC BLOOD PRESSURE: 68 MMHG

## 2025-07-11 DIAGNOSIS — E78.5 HYPERLIPIDEMIA, UNSPECIFIED HYPERLIPIDEMIA TYPE: Primary | ICD-10-CM

## 2025-07-11 LAB — APO B100 SERPL-MCNC: 60 MG/DL (ref 60–117)

## 2025-07-11 PROCEDURE — 99212 OFFICE O/P EST SF 10 MIN: CPT

## 2025-07-11 NOTE — PROGRESS NOTES
[facility]  Date of Referral: 10/4/24    Yu Kitchen presents for management of dyslipidemia    Referral Source:  To   Date First Seen in Clinic 11/11/2024    PERTINENT HLD PMHX  Age at Initial Diagnosis of Dyslipidemia: ~37 y/o       Baseline Lipids Prior to Treatment: unsure   Oldest labs available:    Latest Reference Range & Units 01/11/24 08:05   Cholesterol,Tot 100 - 199 mg/dL 198   Triglycerides 0 - 149 mg/dL 75   HDL >=40 mg/dL 79   LDL <100 mg/dL 104 (H)   (H): Data is abnormally high    History of ASCVD: Documented clinical evidence of ASCVD: and CAD and/or CAC >300    Previously Attempted Interventions for Lipids - including outcome  Statin: unknown statin      Outcome: other unknown   Non-Statin: none  Outcome: not applicable    Secondary causes/contributors (report to medical director if present):   Endocrine/Hypothyroidism:  none reported   Liver disease: none reported   Renal disease/nephrotic syndrome:  none reported  Dietary-induced (ketogenic, lean mass hyper-responder)? no  Medications: None    CURRENT MED MGMT  Current Lipid Lowering Meds:   Statin: atorvastatin 80 mg   Non-Statin: ezetimibe 10 mg once daily  Supplements: None  Current adverse drug reactions/side effects? no  Is Adherence an Issue? no    Any Family History Cardiovascular Disease? yes  Relationship: mother, stroke ; Age of Onset: 70s      Vitals:    07/11/25 1537   BP: 122/68   Pulse: 81      BMI Readings from Last 1 Encounters:   06/20/25 22.44 kg/m²      Wt Readings from Last 3 Encounters:   04/30/25 62.1 kg (137 lb)   02/24/25 62.6 kg (138 lb)   12/18/24 60 kg (132 lb 4.4 oz)     BP Readings from Last 2 Encounters:   07/11/25 122/68   06/20/25 128/70       DATA REVIEW:  Most Recent Lipid Panel:   Lab Results   Component Value Date/Time    CHOLSTRLTOT 139 07/08/2025 08:10 AM    LDL 55 07/08/2025 08:10 AM    HDL 66 07/08/2025 08:10 AM    TRIGLYCERIDE 91 07/08/2025 08:10 AM     Lab Results   Component Value  "Date/Time    LDL 55 07/08/2025 08:10 AM    LDL 61 04/30/2025 08:50 AM    LDL 75 01/28/2025 08:20 AM      Lab Results   Component Value Date/Time    LIPOPROTA 19 08/01/2024 08:05 AM      Lab Results   Component Value Date/Time    APOB 60 07/08/2025 08:10 AM      No results found for: \"CRPHIGHSEN\"    Other Pertinent Blood Work:   Lab Results   Component Value Date    SODIUM 140 07/08/2025    POTASSIUM 4.0 07/08/2025    CHLORIDE 104 07/08/2025    CO2 25 07/08/2025    ANION 11.0 07/08/2025    GLUCOSE 95 07/08/2025    BUN 21 07/08/2025    CREATININE 0.96 07/08/2025    CALCIUM 9.1 07/08/2025    ASTSGOT 25 07/08/2025    ALTSGPT 19 07/08/2025    ALKPHOSPHAT 77 07/08/2025    TBILIRUBIN 0.7 07/08/2025    ALBUMIN 4.3 07/08/2025    AGRATIO 2.0 07/08/2025    TSHULTRASEN 1.950 08/01/2024       VASCULAR IMAGING:  CAC Score (if available): 747    ASSESSMENT AND PLAN    Patient Type, check all that apply: Secondary Prevention    Major ASCVD events: None      Evidence of genetic dyslipidemia (Familial Hyperlipidemia): No     ASCVD risk calculations (if applicable)  The ASCVD Risk score (Boo BROOKS, et al., 2019) failed to calculate., N/A    ACC/AHA Indication for Statin Therapy:  Established ASCVD: Indication for High intensity statin     Other Significant Risk Markers:  High-risk conditions: >64yr old  and Hypertension   Risk-enhancers: None  Lipoprotein(a): Favorable (<30 mg/dl or <75 nmol/L  Most recent CAC (if available): Total Calcium Score: 746.0  Percentile: Calcium score is above the 90th percentile for the patient's age and sex.     Lipid Goals:  Primary: LDL-C <70 mg/dl  Secondary apoB <70 mg/dl  At goals? yes    LIFESTYLE INTERVENTIONS  TOBACCO: none  continued complete avoidance of all tobacco products   EtOH: 1 vodka tonic/OJ at night (1 oz vodka)   Men: No more than two standard servings per day  Women: No more than one standard serving per day  PHYSICAL ACTIVITY: highly active lifestyle, regular swimming, golf, " elliptical, walking, treadmill daily   NUTRITION: healthy lifestyle, emphasis on fish, vegetables & fruit, lean meats     LIPID-LOWERING MEDICATION MANAGEMENT:   No issues, cholesterol at goal.     Statin Therapy:   Continue atorvastatin 80 mg once daily    PCSK9 Inhibitor strategy indications: ASCVD with suboptimal control of LDL-C despite maximally tolerated statin    Non-Statin Meds:   EZETIMIBE: Continue 10 mg once daily  NEXLETOL/NEXLIZET (avoid simva >20, prava >40): Not currently indicated  OMEGA-3 FAs: Not currently indicated  FIBRATE: Not currently indicated - previously discontinued due to well controlled TG's  PCSK9 mAb: Not currently indicated    Recommended Supplements: None     Studies Ordered at Todays Visit: None   Blood Work To Be Obtained Prior to Next Visit: Lipid panel and ApoB  Follow-Up: 3 months, may consider deferring back to cardiology if at goal next visit    Linnette Horton PharmD    CC:  Heaven Parker M.D.  ToMarc M.D.

## 2025-07-31 ENCOUNTER — TELEPHONE (OUTPATIENT)
Dept: INTERNAL MEDICINE | Facility: IMAGING CENTER | Age: 80
End: 2025-07-31
Payer: MEDICARE

## 2025-07-31 DIAGNOSIS — Z79.899 MEDICATION MANAGEMENT: Primary | ICD-10-CM

## 2025-07-31 NOTE — TELEPHONE ENCOUNTER
----- Message from Nurse Gail ABRAMS R.N. sent at 7/31/2025 11:08 AM PDT -----  Please add fasting labs.    Thanks!

## 2025-08-04 ENCOUNTER — HOSPITAL ENCOUNTER (OUTPATIENT)
Facility: MEDICAL CENTER | Age: 80
End: 2025-08-04
Attending: FAMILY MEDICINE
Payer: MEDICARE

## 2025-08-04 ENCOUNTER — NON-PROVIDER VISIT (OUTPATIENT)
Dept: INTERNAL MEDICINE | Facility: IMAGING CENTER | Age: 80
End: 2025-08-04
Payer: MEDICARE

## 2025-08-04 DIAGNOSIS — Z79.899 MEDICATION MANAGEMENT: ICD-10-CM

## 2025-08-04 LAB
25(OH)D3 SERPL-MCNC: 50 NG/ML (ref 30–100)
ALBUMIN SERPL BCP-MCNC: 4.4 G/DL (ref 3.2–4.9)
ALBUMIN/GLOB SERPL: 2.1 G/DL
ALP SERPL-CCNC: 78 U/L (ref 30–99)
ALT SERPL-CCNC: 22 U/L (ref 2–50)
ANION GAP SERPL CALC-SCNC: 10 MMOL/L (ref 7–16)
AST SERPL-CCNC: 27 U/L (ref 12–45)
BASOPHILS # BLD AUTO: 0.9 % (ref 0–1.8)
BASOPHILS # BLD: 0.06 K/UL (ref 0–0.12)
BILIRUB SERPL-MCNC: 0.6 MG/DL (ref 0.1–1.5)
BUN SERPL-MCNC: 18 MG/DL (ref 8–22)
CALCIUM ALBUM COR SERPL-MCNC: 9 MG/DL (ref 8.5–10.5)
CALCIUM SERPL-MCNC: 9.3 MG/DL (ref 8.5–10.5)
CHLORIDE SERPL-SCNC: 104 MMOL/L (ref 96–112)
CHOLEST SERPL-MCNC: 162 MG/DL (ref 100–199)
CO2 SERPL-SCNC: 25 MMOL/L (ref 20–33)
CREAT SERPL-MCNC: 0.84 MG/DL (ref 0.5–1.4)
EOSINOPHIL # BLD AUTO: 0.1 K/UL (ref 0–0.51)
EOSINOPHIL NFR BLD: 1.4 % (ref 0–6.9)
ERYTHROCYTE [DISTWIDTH] IN BLOOD BY AUTOMATED COUNT: 47.5 FL (ref 35.9–50)
EST. AVERAGE GLUCOSE BLD GHB EST-MCNC: 114 MG/DL
GFR SERPLBLD CREATININE-BSD FMLA CKD-EPI: 69 ML/MIN/1.73 M 2
GLOBULIN SER CALC-MCNC: 2.1 G/DL (ref 1.9–3.5)
GLUCOSE SERPL-MCNC: 85 MG/DL (ref 65–99)
HBA1C MFR BLD: 5.6 % (ref 4–5.6)
HCT VFR BLD AUTO: 43.5 % (ref 37–47)
HDLC SERPL-MCNC: 63 MG/DL
HGB BLD-MCNC: 14.2 G/DL (ref 12–16)
IMM GRANULOCYTES # BLD AUTO: 0.02 K/UL (ref 0–0.11)
IMM GRANULOCYTES NFR BLD AUTO: 0.3 % (ref 0–0.9)
LDLC SERPL CALC-MCNC: 78 MG/DL
LYMPHOCYTES # BLD AUTO: 1.64 K/UL (ref 1–4.8)
LYMPHOCYTES NFR BLD: 23.7 % (ref 22–41)
MCH RBC QN AUTO: 29.9 PG (ref 27–33)
MCHC RBC AUTO-ENTMCNC: 32.6 G/DL (ref 32.2–35.5)
MCV RBC AUTO: 91.6 FL (ref 81.4–97.8)
MONOCYTES # BLD AUTO: 0.72 K/UL (ref 0–0.85)
MONOCYTES NFR BLD AUTO: 10.4 % (ref 0–13.4)
NEUTROPHILS # BLD AUTO: 4.39 K/UL (ref 1.82–7.42)
NEUTROPHILS NFR BLD: 63.3 % (ref 44–72)
NRBC # BLD AUTO: 0 K/UL
NRBC BLD-RTO: 0 /100 WBC (ref 0–0.2)
PLATELET # BLD AUTO: 270 K/UL (ref 164–446)
PMV BLD AUTO: 10 FL (ref 9–12.9)
POTASSIUM SERPL-SCNC: 4.3 MMOL/L (ref 3.6–5.5)
PROT SERPL-MCNC: 6.5 G/DL (ref 6–8.2)
RBC # BLD AUTO: 4.75 M/UL (ref 4.2–5.4)
SODIUM SERPL-SCNC: 139 MMOL/L (ref 135–145)
TRIGL SERPL-MCNC: 103 MG/DL (ref 0–149)
TSH SERPL-ACNC: 1.43 UIU/ML (ref 0.38–5.33)
VIT B12 SERPL-MCNC: 389 PG/ML (ref 211–911)
WBC # BLD AUTO: 6.9 K/UL (ref 4.8–10.8)

## 2025-08-04 PROCEDURE — 83036 HEMOGLOBIN GLYCOSYLATED A1C: CPT

## 2025-08-04 PROCEDURE — 82607 VITAMIN B-12: CPT

## 2025-08-04 PROCEDURE — 84443 ASSAY THYROID STIM HORMONE: CPT

## 2025-08-04 PROCEDURE — 80061 LIPID PANEL: CPT

## 2025-08-04 PROCEDURE — 85025 COMPLETE CBC W/AUTO DIFF WBC: CPT

## 2025-08-04 PROCEDURE — 82306 VITAMIN D 25 HYDROXY: CPT

## 2025-08-04 PROCEDURE — 80053 COMPREHEN METABOLIC PANEL: CPT

## 2025-08-11 ENCOUNTER — OFFICE VISIT (OUTPATIENT)
Dept: INTERNAL MEDICINE | Facility: IMAGING CENTER | Age: 80
End: 2025-08-11
Payer: MEDICARE

## 2025-08-11 VITALS
DIASTOLIC BLOOD PRESSURE: 70 MMHG | BODY MASS INDEX: 22.02 KG/M2 | TEMPERATURE: 96.9 F | WEIGHT: 137 LBS | HEART RATE: 77 BPM | SYSTOLIC BLOOD PRESSURE: 124 MMHG | HEIGHT: 66 IN | OXYGEN SATURATION: 95 % | RESPIRATION RATE: 14 BRPM

## 2025-08-11 DIAGNOSIS — Z79.899 MEDICATION MANAGEMENT: ICD-10-CM

## 2025-08-11 DIAGNOSIS — Z23 NEED FOR PNEUMOCOCCAL VACCINATION: ICD-10-CM

## 2025-08-11 DIAGNOSIS — I10 ESSENTIAL HYPERTENSION, BENIGN: Chronic | ICD-10-CM

## 2025-08-11 DIAGNOSIS — E78.5 HYPERLIPIDEMIA, UNSPECIFIED HYPERLIPIDEMIA TYPE: Chronic | ICD-10-CM

## 2025-08-11 DIAGNOSIS — Z00.00 MEDICARE ANNUAL WELLNESS VISIT, SUBSEQUENT: Primary | ICD-10-CM

## 2025-08-11 PROCEDURE — G0439 PPPS, SUBSEQ VISIT: HCPCS | Mod: 25 | Performed by: FAMILY MEDICINE

## 2025-08-11 PROCEDURE — 3074F SYST BP LT 130 MM HG: CPT | Performed by: FAMILY MEDICINE

## 2025-08-11 PROCEDURE — G0009 ADMIN PNEUMOCOCCAL VACCINE: HCPCS | Performed by: FAMILY MEDICINE

## 2025-08-11 PROCEDURE — 3078F DIAST BP <80 MM HG: CPT | Performed by: FAMILY MEDICINE

## 2025-08-11 PROCEDURE — 90677 PCV20 VACCINE IM: CPT | Performed by: FAMILY MEDICINE

## 2025-08-11 ASSESSMENT — ACTIVITIES OF DAILY LIVING (ADL): BATHING_REQUIRES_ASSISTANCE: 0

## 2025-08-11 ASSESSMENT — ENCOUNTER SYMPTOMS: GENERAL WELL-BEING: EXCELLENT

## 2025-08-11 ASSESSMENT — PATIENT HEALTH QUESTIONNAIRE - PHQ9: CLINICAL INTERPRETATION OF PHQ2 SCORE: 0

## 2025-08-11 ASSESSMENT — FIBROSIS 4 INDEX: FIB4 SCORE: 1.75
